# Patient Record
Sex: MALE | Race: WHITE | Employment: OTHER | URBAN - METROPOLITAN AREA
[De-identification: names, ages, dates, MRNs, and addresses within clinical notes are randomized per-mention and may not be internally consistent; named-entity substitution may affect disease eponyms.]

---

## 2017-10-25 RX ORDER — HYDROCHLOROTHIAZIDE 25 MG/1
TABLET ORAL
Qty: 90 TABLET | Refills: 0 | Status: SHIPPED | OUTPATIENT
Start: 2017-10-25 | End: 2017-12-18 | Stop reason: SDUPTHER

## 2017-11-27 RX ORDER — CARVEDILOL 3.12 MG/1
TABLET ORAL
Qty: 180 TABLET | Refills: 0 | Status: SHIPPED | OUTPATIENT
Start: 2017-11-27 | End: 2017-12-18 | Stop reason: SDUPTHER

## 2017-11-27 RX ORDER — ATORVASTATIN CALCIUM 80 MG/1
TABLET, FILM COATED ORAL
Qty: 90 TABLET | Refills: 0 | Status: SHIPPED | OUTPATIENT
Start: 2017-11-27 | End: 2017-12-18 | Stop reason: SDUPTHER

## 2017-12-18 ENCOUNTER — OFFICE VISIT (OUTPATIENT)
Dept: CARDIOLOGY CLINIC | Age: 62
End: 2017-12-18

## 2017-12-18 VITALS
HEIGHT: 72 IN | DIASTOLIC BLOOD PRESSURE: 82 MMHG | BODY MASS INDEX: 30.2 KG/M2 | OXYGEN SATURATION: 97 % | HEART RATE: 86 BPM | SYSTOLIC BLOOD PRESSURE: 130 MMHG | WEIGHT: 223 LBS

## 2017-12-18 DIAGNOSIS — I25.10 CORONARY ARTERY DISEASE INVOLVING NATIVE CORONARY ARTERY OF NATIVE HEART WITHOUT ANGINA PECTORIS: ICD-10-CM

## 2017-12-18 DIAGNOSIS — I10 ESSENTIAL HYPERTENSION: Primary | ICD-10-CM

## 2017-12-18 DIAGNOSIS — E78.2 MIXED HYPERLIPIDEMIA: ICD-10-CM

## 2017-12-18 PROCEDURE — 1036F TOBACCO NON-USER: CPT | Performed by: INTERNAL MEDICINE

## 2017-12-18 PROCEDURE — 99213 OFFICE O/P EST LOW 20 MIN: CPT | Performed by: INTERNAL MEDICINE

## 2017-12-18 PROCEDURE — G8599 NO ASA/ANTIPLAT THER USE RNG: HCPCS | Performed by: INTERNAL MEDICINE

## 2017-12-18 PROCEDURE — 3017F COLORECTAL CA SCREEN DOC REV: CPT | Performed by: INTERNAL MEDICINE

## 2017-12-18 PROCEDURE — G8484 FLU IMMUNIZE NO ADMIN: HCPCS | Performed by: INTERNAL MEDICINE

## 2017-12-18 PROCEDURE — G8427 DOCREV CUR MEDS BY ELIG CLIN: HCPCS | Performed by: INTERNAL MEDICINE

## 2017-12-18 PROCEDURE — G8417 CALC BMI ABV UP PARAM F/U: HCPCS | Performed by: INTERNAL MEDICINE

## 2017-12-18 RX ORDER — CARVEDILOL 3.12 MG/1
TABLET ORAL
Qty: 180 TABLET | Refills: 3 | Status: SHIPPED | OUTPATIENT
Start: 2017-12-18 | End: 2018-12-03 | Stop reason: SDUPTHER

## 2017-12-18 RX ORDER — ATORVASTATIN CALCIUM 80 MG/1
TABLET, FILM COATED ORAL
Qty: 90 TABLET | Refills: 3 | Status: SHIPPED | OUTPATIENT
Start: 2017-12-18 | End: 2019-12-09 | Stop reason: SDUPTHER

## 2017-12-18 RX ORDER — HYDROCHLOROTHIAZIDE 25 MG/1
TABLET ORAL
Qty: 90 TABLET | Refills: 3 | Status: SHIPPED | OUTPATIENT
Start: 2017-12-18 | End: 2018-11-23 | Stop reason: SDUPTHER

## 2017-12-18 NOTE — LETTER
[address] Cardiology - 61 [facility] [address] [address]  Phone: [phone]  Fax: [phone]    Sophia Bueno MD        2017     [address] [place] [id number]    Patient: Sonia Daniels  MR Number: [id number]  YOB: 1955  Date of Visit: 2017    Dear Dr. Rolando Martínez:    Thank you for allowing me to participate in the care of Sonia Daniels. Below are the relevant portions of my assessment and plan of care. [address]   Cardiovascular Evaluation    PATIENT: Sonia Daniels  DATE: 2017  MRN: [id number]  CSN: [id number]  : 1955    Primary Care Doctor: Rolando Martínez    Reason for evaluation:   Coronary Artery Disease; Hypertension; and Hyperlipidemia      History of present illness on initial date of evaluation:   Sonia Daniels is a 57 y.o. patient who presents for follow up CAD. He states he is feeling very well. He does admit his diabetes numbers have gone up. He is seeing Dr. Faustino Thurston for this. Patient Active Problem List   Diagnosis    Diabetes mellitus ([facility]    Essential hypertension    HLD (hyperlipidemia)    GERD (gastroesophageal reflux disease)    CAD (coronary artery disease)    Dehydration    Acute renal failure (HCC)    Prerenal azotemia    Nausea and vomiting    Sepsis due to Gram negative bacteria (HCC)    Fever and chills    Hypotension    Tachypnea    Lower urinary tract infectious disease    Poor appetite    NSTEMI (non-ST elevated myocardial infarction) ([address]    Chest pain         Cardiac Testing: I have reviewed the findings below. EKG:  ECHO:   STRESS TEST:  CATH: 2014  CONCLUSIONS: Two-vessel coronary artery angioplasty and drug-eluting stent  in the setting of non-ST-segment-elevation myocardial infarction completed by  Dr. Sophia Bueno at [facility] on 2014.     BYPASS:  VASCULAR: 1. Coronary artery disease: stable without concerning s/s of angina   ~Two-vessel coronary artery angioplasty and drug-eluting stent in the setting of non-ST-segment-elevation myocardial infarction completed by August 22, 2014  2. Hypertension   BP: (130)/(82)   3. Hyperlipidemia  4. Obesity   ~Body mass index is 30.24 kg/m². 5. DM  6. Post nephrectomy for renal CA    Plan:  1. The patient was seen for >25 minutes. >50% of the time was devoted to giving the patient detailed instructions instructions on addressing diet, regular exercise, weight control, smoking abstention, medication compliance, and stress minimization. The patient was provided written and verbal instructions regarding risk factor modification. 2. To improve overall cardiovascular health, the patient is instructed to increase cardiovascular related activities with a goal of 150 min/week of moderate level activity or 10,000 steps per day. 3. I recommend that the patient continue their currently prescribed medications. Their drug modifiable risk factors appear to be well controlled. I will continue to address the need/dosing of medications in future visits. 4. Follow up with me in 1 year. All questions and concerns were addressed to the patient/family. Alternatives to my treatment were discussed. The note was completed using EMR. Every effort was made to ensure accuracy; however, inadvertent computerized transcription errors may be present. Camacho aSndy MD, [facility] [place], [place]  [phone] [facility]  [phone] [facility]  12/18/2017  8:53 AM      If you have questions, please do not hesitate to call me. I look forward to following Darus Argue along with you.     Sincerely,        Camacho Sandy MD

## 2017-12-18 NOTE — PROGRESS NOTES
[address] [facility]   Cardiovascular Evaluation    PATIENT: Leon Fjaardo  DATE: 2017  MRN: [id number]  CSN: [id number]  : 1955    Primary Care Doctor: Aayush Trevino    Reason for evaluation:   Coronary Artery Disease; Hypertension; and Hyperlipidemia      History of present illness on initial date of evaluation:   Leon Fajardo is a 57 y.o. patient who presents for follow up CAD. He states he is feeling very well. He does admit his diabetes numbers have gone up. He is seeing Dr. Fish Pryor for this. Patient Active Problem List   Diagnosis    Diabetes mellitus ([address]    Essential hypertension    HLD (hyperlipidemia)    GERD (gastroesophageal reflux disease)    CAD (coronary artery disease)    Dehydration    Acute renal failure (HCC)    Prerenal azotemia    Nausea and vomiting    Sepsis due to Gram negative bacteria (HCC)    Fever and chills    Hypotension    Tachypnea    Lower urinary tract infectious disease    Poor appetite    NSTEMI (non-ST elevated myocardial infarction) ([address]    Chest pain         Cardiac Testing: I have reviewed the findings below. EKG:  ECHO:   STRESS TEST:  CATH: 2014  CONCLUSIONS: Two-vessel coronary artery angioplasty and drug-eluting stent  in the setting of non-ST-segment-elevation myocardial infarction completed by  Dr. Lefty Barton at [facility] on 2014. BYPASS:  VASCULAR:      Past Medical History:   has a past medical history of Abdominal pain, other specified site; Abnormal liver function test; CAD (coronary artery disease); Cancer ([address] Chest pain; Diabetes mellitus ([address] GERD (gastroesophageal reflux disease); Hx of blood clots; Hypercholesteremia; Hypertension; Jaundice; and Renal mass, left. Surgical History:   has a past surgical history that includes knee surgery; Cardiac catheterization (2011);  Upper gastrointestinal endoscopy (13); partial nephrectomy (Left, 2013); and for - mood swings or temperature intolerance  Breast ROS: deferred  Respiratory ROS: negative for - hemoptysis or stridor  Cardiovascular ROS: negative for - chest pain, dyspnea on exertion or palpitations  Gastrointestinal ROS: no abdominal pain, change in bowel habits, or black or bloody stools  Genito-Urinary ROS: no dysuria, trouble voiding, or hematuria  Musculoskeletal ROS: negative for - gait disturbance, joint pain or joint stiffness  Neurological ROS: negative for - seizures or speech problems  Dermatological ROS: negative for - rash or skin lesion changes      Physical Examination:    Vitals:    12/18/17 0837   BP: 130/82   Pulse: 86   SpO2: 97%    Weight: 223 lb (101.2 kg)     Wt Readings from Last 3 Encounters:   12/18/17 223 lb (101.2 kg)   12/22/16 216 lb (98 kg)   06/09/15 223 lb (101.2 kg)         General Appearance:  Alert, cooperative, no distress, appears stated age   Head:  Normocephalic, without obvious abnormality, atraumatic   Eyes:  PERRL, conjunctiva/corneas clear       Nose: Nares normal, no drainage or sinus tenderness   Throat: Lips, mucosa, and tongue normal   Neck: Supple, symmetrical, trachea midline, no adenopathy, thyroid: not enlarged, symmetric, no tenderness/mass/nodules, no carotid bruit or JVD       Lungs:   Clear to auscultation bilaterally, respirations unlabored   Chest Wall:  No tenderness or deformity   Heart:  Regular rhythm and normal rate; S1, S2 are normal; no murmur noted; no rub or gallop   Abdomen:   Soft, non-tender, bowel sounds active all four quadrants,  no masses, no organomegaly           Extremities: Extremities normal, atraumatic, no cyanosis or edema   Pulses: 2+ and symmetric   Skin: Skin color, texture, turgor normal, no rashes or lesions   Pysch: Normal mood and affect   Neurologic: Normal gross motor and sensory exam.         Labs  CBC:   Lab Results   Component Value Date    WBC 8.8 08/23/2014    RBC 4.64 08/23/2014    HGB 14.4 08/23/2014    HCT 42.6 08/23/2014    MCV 91.8 08/23/2014    RDW 13.5 08/23/2014     08/23/2014     CMP:    Lab Results   Component Value Date     08/23/2014    K 4.0 08/23/2014    CL 99 08/23/2014    CO2 25 08/23/2014    BUN 16 08/23/2014    CREATININE 1.0 08/23/2014    GFRAA >60 08/23/2014    GFRAA >60 04/03/2013    AGRATIO 1.2 08/21/2014    LABGLOM >60 08/23/2014    GLUCOSE 147 08/23/2014    PROT 7.7 08/21/2014    PROT 6.8 07/05/2011    CALCIUM 9.2 08/23/2014    BILITOT 0.3 08/21/2014    ALKPHOS 60 08/21/2014    AST 27 08/21/2014    ALT 11 08/21/2014     PT/INR:    No components found for: PTPATIENT,  PTINR  Lab Results   Component Value Date    CKTOTAL 390 (H) 04/21/2014    TROPONINI 0.04 (H) 08/22/2014     No components found for: CHLPL  Lab Results   Component Value Date    TRIG 325 (H) 08/22/2014    TRIG 269 (H) 04/01/2013    TRIG 581 (H) 07/05/2011     Lab Results   Component Value Date    HDL 24 (L) 08/22/2014    HDL 28 (L) 04/01/2013    HDL 25 (L) 07/05/2011     Lab Results   Component Value Date    LDLCALC see below 08/22/2014    LDLCALC 116 (H) 04/01/2013     Lab Results   Component Value Date    LABVLDL see below 08/22/2014    LABVLDL 54 04/01/2013     Lab Results   Component Value Date    ALT 11 08/21/2014    AST 27 08/21/2014    ALKPHOS 60 08/21/2014    BILITOT 0.3 08/21/2014       Assessment:  57 y.o. patient with:  1. Coronary artery disease: stable without concerning s/s of angina   ~Two-vessel coronary artery angioplasty and drug-eluting stent in the setting of non-ST-segment-elevation myocardial infarction completed by August 22, 2014  2. Hypertension   BP: (130)/(82)   3. Hyperlipidemia  4. Obesity   ~Body mass index is 30.24 kg/m². 5. DM  6. Post nephrectomy for renal CA    Plan:  1. The patient was seen for >25 minutes.  >50% of the time was devoted to giving the patient detailed instructions instructions on addressing diet, regular exercise, weight control, smoking abstention, medication compliance, and stress minimization. The patient was provided written and verbal instructions regarding risk factor modification. 2. To improve overall cardiovascular health, the patient is instructed to increase cardiovascular related activities with a goal of 150 min/week of moderate level activity or 10,000 steps per day. 3. I recommend that the patient continue their currently prescribed medications. Their drug modifiable risk factors appear to be well controlled. I will continue to address the need/dosing of medications in future visits. 4. Follow up with me in 1 year. All questions and concerns were addressed to the patient/family. Alternatives to my treatment were discussed. The note was completed using EMR. Every effort was made to ensure accuracy; however, inadvertent computerized transcription errors may be present.     Perlita Turner MD, [address], [place], [place]  [phone] [facility] office  [phone] [facility] central  12/18/2017  8:53 AM

## 2017-12-27 NOTE — COMMUNICATION BODY
[address] [facility]   Cardiovascular Evaluation    PATIENT: Matty Olson  DATE: 2017  MRN: [id number]  CSN: [id number]  : 1955    Primary Care Doctor: Marcelo Chavez    Reason for evaluation:   Coronary Artery Disease; Hypertension; and Hyperlipidemia      History of present illness on initial date of evaluation:   Matty Olson is a 57 y.o. patient who presents for follow up CAD. He states he is feeling very well. He does admit his diabetes numbers have gone up. He is seeing Dr. Elio Vieira for this. Patient Active Problem List   Diagnosis    Diabetes mellitus ([address]    Essential hypertension    HLD (hyperlipidemia)    GERD (gastroesophageal reflux disease)    CAD (coronary artery disease)    Dehydration    Acute renal failure (HCC)    Prerenal azotemia    Nausea and vomiting    Sepsis due to Gram negative bacteria (HCC)    Fever and chills    Hypotension    Tachypnea    Lower urinary tract infectious disease    Poor appetite    NSTEMI (non-ST elevated myocardial infarction) ([address]    Chest pain         Cardiac Testing: I have reviewed the findings below. EKG:  ECHO:   STRESS TEST:  CATH: 2014  CONCLUSIONS: Two-vessel coronary artery angioplasty and drug-eluting stent  in the setting of non-ST-segment-elevation myocardial infarction completed by  Dr. Lyndon Castillo at [facility] on 2014. BYPASS:  VASCULAR:      Past Medical History:   has a past medical history of Abdominal pain, other specified site; Abnormal liver function test; CAD (coronary artery disease); Cancer ([address] Chest pain; Diabetes mellitus ([address] GERD (gastroesophageal reflux disease); Hx of blood clots; Hypercholesteremia; Hypertension; Jaundice; and Renal mass, left. Surgical History:   has a past surgical history that includes knee surgery; Cardiac catheterization (2011);  Upper gastrointestinal endoscopy (13); partial nephrectomy (Left, 2013); and for - mood swings or temperature intolerance  Breast ROS: deferred  Respiratory ROS: negative for - hemoptysis or stridor  Cardiovascular ROS: negative for - chest pain, dyspnea on exertion or palpitations  Gastrointestinal ROS: no abdominal pain, change in bowel habits, or black or bloody stools  Genito-Urinary ROS: no dysuria, trouble voiding, or hematuria  Musculoskeletal ROS: negative for - gait disturbance, joint pain or joint stiffness  Neurological ROS: negative for - seizures or speech problems  Dermatological ROS: negative for - rash or skin lesion changes      Physical Examination:    Vitals:    12/18/17 0837   BP: 130/82   Pulse: 86   SpO2: 97%    Weight: 223 lb (101.2 kg)     Wt Readings from Last 3 Encounters:   12/18/17 223 lb (101.2 kg)   12/22/16 216 lb (98 kg)   06/09/15 223 lb (101.2 kg)         General Appearance:  Alert, cooperative, no distress, appears stated age   Head:  Normocephalic, without obvious abnormality, atraumatic   Eyes:  PERRL, conjunctiva/corneas clear       Nose: Nares normal, no drainage or sinus tenderness   Throat: Lips, mucosa, and tongue normal   Neck: Supple, symmetrical, trachea midline, no adenopathy, thyroid: not enlarged, symmetric, no tenderness/mass/nodules, no carotid bruit or JVD       Lungs:   Clear to auscultation bilaterally, respirations unlabored   Chest Wall:  No tenderness or deformity   Heart:  Regular rhythm and normal rate; S1, S2 are normal; no murmur noted; no rub or gallop   Abdomen:   Soft, non-tender, bowel sounds active all four quadrants,  no masses, no organomegaly           Extremities: Extremities normal, atraumatic, no cyanosis or edema   Pulses: 2+ and symmetric   Skin: Skin color, texture, turgor normal, no rashes or lesions   Pysch: Normal mood and affect   Neurologic: Normal gross motor and sensory exam.         Labs  CBC:   Lab Results   Component Value Date    WBC 8.8 08/23/2014    RBC 4.64 08/23/2014    HGB 14.4 08/23/2014    HCT 42.6 08/23/2014    MCV 91.8 08/23/2014    RDW 13.5 08/23/2014     08/23/2014     CMP:    Lab Results   Component Value Date     08/23/2014    K 4.0 08/23/2014    CL 99 08/23/2014    CO2 25 08/23/2014    BUN 16 08/23/2014    CREATININE 1.0 08/23/2014    GFRAA >60 08/23/2014    GFRAA >60 04/03/2013    AGRATIO 1.2 08/21/2014    LABGLOM >60 08/23/2014    GLUCOSE 147 08/23/2014    PROT 7.7 08/21/2014    PROT 6.8 07/05/2011    CALCIUM 9.2 08/23/2014    BILITOT 0.3 08/21/2014    ALKPHOS 60 08/21/2014    AST 27 08/21/2014    ALT 11 08/21/2014     PT/INR:    No components found for: PTPATIENT,  PTINR  Lab Results   Component Value Date    CKTOTAL 390 (H) 04/21/2014    TROPONINI 0.04 (H) 08/22/2014     No components found for: CHLPL  Lab Results   Component Value Date    TRIG 325 (H) 08/22/2014    TRIG 269 (H) 04/01/2013    TRIG 581 (H) 07/05/2011     Lab Results   Component Value Date    HDL 24 (L) 08/22/2014    HDL 28 (L) 04/01/2013    HDL 25 (L) 07/05/2011     Lab Results   Component Value Date    LDLCALC see below 08/22/2014    LDLCALC 116 (H) 04/01/2013     Lab Results   Component Value Date    LABVLDL see below 08/22/2014    LABVLDL 54 04/01/2013     Lab Results   Component Value Date    ALT 11 08/21/2014    AST 27 08/21/2014    ALKPHOS 60 08/21/2014    BILITOT 0.3 08/21/2014       Assessment:  57 y.o. patient with:  1. Coronary artery disease: stable without concerning s/s of angina   ~Two-vessel coronary artery angioplasty and drug-eluting stent in the setting of non-ST-segment-elevation myocardial infarction completed by August 22, 2014  2. Hypertension   BP: (130)/(82)   3. Hyperlipidemia  4. Obesity   ~Body mass index is 30.24 kg/m². 5. DM  6. Post nephrectomy for renal CA    Plan:  1. The patient was seen for >25 minutes.  >50% of the time was devoted to giving the patient detailed instructions instructions on addressing diet, regular exercise, weight control, smoking abstention, medication compliance, and stress minimization. The patient was provided written and verbal instructions regarding risk factor modification. 2. To improve overall cardiovascular health, the patient is instructed to increase cardiovascular related activities with a goal of 150 min/week of moderate level activity or 10,000 steps per day. 3. I recommend that the patient continue their currently prescribed medications. Their drug modifiable risk factors appear to be well controlled. I will continue to address the need/dosing of medications in future visits. 4. Follow up with me in 1 year. All questions and concerns were addressed to the patient/family. Alternatives to my treatment were discussed. The note was completed using EMR. Every effort was made to ensure accuracy; however, inadvertent computerized transcription errors may be present.     Robinson Jasso MD, [address], [place], [place]  [phone] [facility]  [phone] [facility]  12/18/2017  8:53 AM

## 2018-11-26 RX ORDER — HYDROCHLOROTHIAZIDE 25 MG/1
TABLET ORAL
Qty: 90 TABLET | Refills: 0 | Status: SHIPPED | OUTPATIENT
Start: 2018-11-26 | End: 2018-12-03 | Stop reason: SDUPTHER

## 2018-11-26 NOTE — TELEPHONE ENCOUNTER
BRYANP Pt  Last office visit 12/18/2017    Assessment:  57 y.o. patient with:  1. Coronary artery disease: stable without concerning s/s of angina              ~Two-vessel coronary artery angioplasty and drug-eluting stent in the setting of non-ST-segment-elevation myocardial infarction completed by August 22, 2014  2. Hypertension              BP: (130)/(82)   3. Hyperlipidemia  4. Obesity              ~Body mass index is 30.24 kg/m². 5. DM  6. Post nephrectomy for renal CA     Plan:  1. The patient was seen for >25 minutes. >50% of the time was devoted to giving the patient detailed instructions instructions on addressing diet, regular exercise, weight control, smoking abstention, medication compliance, and stress minimization. The patient was provided written and verbal instructions regarding risk factor modification. 2. To improve overall cardiovascular health, the patient is instructed to increase cardiovascular related activities with a goal of 150 min/week of moderate level activity or 10,000 steps per day. 3. I recommend that the patient continue their currently prescribed medications. Their drug modifiable risk factors appear to be well controlled. I will continue to address the need/dosing of medications in future visits. 4. Follow up with me in 1 year.        Upcoming appt BRYANP 12/3/2018

## 2018-12-03 ENCOUNTER — OFFICE VISIT (OUTPATIENT)
Dept: CARDIOLOGY CLINIC | Age: 63
End: 2018-12-03
Payer: MEDICARE

## 2018-12-03 VITALS
OXYGEN SATURATION: 98 % | WEIGHT: 219.4 LBS | BODY MASS INDEX: 29.72 KG/M2 | SYSTOLIC BLOOD PRESSURE: 128 MMHG | HEIGHT: 72 IN | DIASTOLIC BLOOD PRESSURE: 78 MMHG | HEART RATE: 65 BPM

## 2018-12-03 DIAGNOSIS — I10 ESSENTIAL HYPERTENSION: ICD-10-CM

## 2018-12-03 DIAGNOSIS — I25.10 CORONARY ARTERY DISEASE INVOLVING NATIVE CORONARY ARTERY OF NATIVE HEART WITHOUT ANGINA PECTORIS: Primary | ICD-10-CM

## 2018-12-03 DIAGNOSIS — E78.2 MIXED HYPERLIPIDEMIA: ICD-10-CM

## 2018-12-03 PROCEDURE — 99214 OFFICE O/P EST MOD 30 MIN: CPT | Performed by: INTERNAL MEDICINE

## 2018-12-03 RX ORDER — HYDROCHLOROTHIAZIDE 25 MG/1
TABLET ORAL
Qty: 90 TABLET | Refills: 3 | Status: SHIPPED | OUTPATIENT
Start: 2018-12-03 | End: 2018-12-03 | Stop reason: SDUPTHER

## 2018-12-03 RX ORDER — CARVEDILOL 3.12 MG/1
TABLET ORAL
Qty: 180 TABLET | Refills: 3 | Status: SHIPPED | OUTPATIENT
Start: 2018-12-03 | End: 2018-12-03 | Stop reason: SDUPTHER

## 2018-12-03 RX ORDER — HYDROCHLOROTHIAZIDE 25 MG/1
TABLET ORAL
Qty: 90 TABLET | Refills: 3 | Status: SHIPPED | OUTPATIENT
Start: 2018-12-03 | End: 2019-12-09 | Stop reason: SDUPTHER

## 2018-12-03 RX ORDER — CARVEDILOL 3.12 MG/1
TABLET ORAL
Qty: 180 TABLET | Refills: 3 | Status: SHIPPED | OUTPATIENT
Start: 2018-12-03 | End: 2019-12-09 | Stop reason: SDUPTHER

## 2018-12-03 NOTE — PROGRESS NOTES
current medications as prescribed. 2. Discussed the importance of diabetes control, weight reduction and lifestyle modification. 3. Follow up with me in  1 year. This note was scribed in the presence of Bishop Law MD, by Aguila Lopez RN.     I, Dr. Bishop Law, personally performed the services described in this documentation, as scribed by the above signed scribe in my presence. It is both accurate and complete to my knowledge. I agree with the details independently gathered by the clinical support staff, while the remaining scribed note accurately describes my personal service to the patient. All questions and concerns were addressed to the patient/family. Alternatives to my treatment were discussed. The note was completed using EMR. Every effort was made to ensure accuracy; however, inadvertent computerized transcription errors may be present.     Bishop Law MD, [address], [place], [place]  [phone] [facility] [place]  [phone] [facility]  12/3/2018  9:09 AM

## 2019-12-09 ENCOUNTER — OFFICE VISIT (OUTPATIENT)
Dept: CARDIOLOGY CLINIC | Age: 64
End: 2019-12-09
Payer: MEDICARE

## 2019-12-09 VITALS
SYSTOLIC BLOOD PRESSURE: 150 MMHG | OXYGEN SATURATION: 99 % | BODY MASS INDEX: 29.34 KG/M2 | HEART RATE: 79 BPM | DIASTOLIC BLOOD PRESSURE: 82 MMHG | WEIGHT: 216.6 LBS | HEIGHT: 72 IN

## 2019-12-09 DIAGNOSIS — I25.10 CORONARY ARTERY DISEASE INVOLVING NATIVE CORONARY ARTERY OF NATIVE HEART WITHOUT ANGINA PECTORIS: Primary | ICD-10-CM

## 2019-12-09 DIAGNOSIS — R06.02 SHORTNESS OF BREATH: ICD-10-CM

## 2019-12-09 DIAGNOSIS — I10 ESSENTIAL HYPERTENSION: ICD-10-CM

## 2019-12-09 DIAGNOSIS — E78.2 MIXED HYPERLIPIDEMIA: ICD-10-CM

## 2019-12-09 PROCEDURE — 1036F TOBACCO NON-USER: CPT | Performed by: INTERNAL MEDICINE

## 2019-12-09 PROCEDURE — G8417 CALC BMI ABV UP PARAM F/U: HCPCS | Performed by: INTERNAL MEDICINE

## 2019-12-09 PROCEDURE — 3017F COLORECTAL CA SCREEN DOC REV: CPT | Performed by: INTERNAL MEDICINE

## 2019-12-09 PROCEDURE — 99214 OFFICE O/P EST MOD 30 MIN: CPT | Performed by: INTERNAL MEDICINE

## 2019-12-09 PROCEDURE — G8484 FLU IMMUNIZE NO ADMIN: HCPCS | Performed by: INTERNAL MEDICINE

## 2019-12-09 PROCEDURE — 1123F ACP DISCUSS/DSCN MKR DOCD: CPT | Performed by: INTERNAL MEDICINE

## 2019-12-09 PROCEDURE — G8599 NO ASA/ANTIPLAT THER USE RNG: HCPCS | Performed by: INTERNAL MEDICINE

## 2019-12-09 PROCEDURE — 4040F PNEUMOC VAC/ADMIN/RCVD: CPT | Performed by: INTERNAL MEDICINE

## 2019-12-09 PROCEDURE — G8427 DOCREV CUR MEDS BY ELIG CLIN: HCPCS | Performed by: INTERNAL MEDICINE

## 2019-12-09 RX ORDER — HYDROCHLOROTHIAZIDE 25 MG/1
TABLET ORAL
Qty: 90 TABLET | Refills: 3 | Status: SHIPPED | OUTPATIENT
Start: 2019-12-09 | End: 2020-12-01

## 2019-12-09 RX ORDER — ATORVASTATIN CALCIUM 80 MG/1
TABLET, FILM COATED ORAL
Qty: 90 TABLET | Refills: 3 | Status: SHIPPED | OUTPATIENT
Start: 2019-12-09 | End: 2020-12-01

## 2019-12-09 RX ORDER — CARVEDILOL 3.12 MG/1
TABLET ORAL
Qty: 180 TABLET | Refills: 3 | Status: SHIPPED | OUTPATIENT
Start: 2019-12-09 | End: 2019-12-27 | Stop reason: SDUPTHER

## 2019-12-17 ENCOUNTER — HOSPITAL ENCOUNTER (OUTPATIENT)
Dept: CARDIOLOGY | Age: 64
Discharge: HOME | End: 2019-12-17
Payer: MEDICARE

## 2019-12-17 DIAGNOSIS — R06.02 SHORTNESS OF BREATH: ICD-10-CM

## 2019-12-17 LAB
LV EF: 45 %
LVEF MODALITY: NORMAL

## 2019-12-17 PROCEDURE — 93306 TTE W/DOPPLER COMPLETE: CPT

## 2019-12-27 ENCOUNTER — HOSPITAL ENCOUNTER (OUTPATIENT)
Dept: CARDIOLOGY | Age: 64
Discharge: HOME | End: 2019-12-27
Payer: MEDICARE

## 2019-12-27 DIAGNOSIS — I10 ESSENTIAL HYPERTENSION: Primary | ICD-10-CM

## 2019-12-27 LAB
LV EF: 41 %
LVEF MODALITY: NORMAL

## 2019-12-27 PROCEDURE — 93017 CV STRESS TEST TRACING ONLY: CPT

## 2019-12-27 PROCEDURE — 3430000000 HC RX DIAGNOSTIC RADIOPHARMACEUTICAL: Performed by: INTERNAL MEDICINE

## 2019-12-27 PROCEDURE — 78452 HT MUSCLE IMAGE SPECT MULT: CPT

## 2019-12-27 PROCEDURE — A9502 TC99M TETROFOSMIN: HCPCS | Performed by: INTERNAL MEDICINE

## 2019-12-27 RX ORDER — LISINOPRIL 10 MG/1
10 TABLET ORAL DAILY
Qty: 90 TABLET | Refills: 1 | Status: SHIPPED | OUTPATIENT
Start: 2019-12-27 | End: 2021-08-23 | Stop reason: SDUPTHER

## 2019-12-27 RX ORDER — CARVEDILOL 6.25 MG/1
TABLET ORAL
Qty: 180 TABLET | Refills: 3 | Status: SHIPPED | OUTPATIENT
Start: 2019-12-27 | End: 2020-12-01

## 2019-12-27 RX ADMIN — TETROFOSMIN 34.5 MILLICURIE: 1.38 INJECTION, POWDER, LYOPHILIZED, FOR SOLUTION INTRAVENOUS at 09:03

## 2019-12-27 RX ADMIN — TETROFOSMIN 10.3 MILLICURIE: 1.38 INJECTION, POWDER, LYOPHILIZED, FOR SOLUTION INTRAVENOUS at 07:45

## 2020-02-04 ENCOUNTER — HOSPITAL ENCOUNTER (OUTPATIENT)
Age: 65
Discharge: HOME | End: 2020-02-04
Payer: MEDICARE

## 2020-02-04 LAB
ALBUMIN SERPL-MCNC: 3.8 G/DL (ref 3.4–5)
ANION GAP SERPL CALCULATED.3IONS-SCNC: 17 MMOL/L (ref 3–16)
BUN BLDV-MCNC: 20 MG/DL (ref 7–20)
CALCIUM SERPL-MCNC: 9.9 MG/DL (ref 8.3–10.6)
CHLORIDE BLD-SCNC: 100 MMOL/L (ref 99–110)
CO2: 23 MMOL/L (ref 21–32)
CREAT SERPL-MCNC: 0.8 MG/DL (ref 0.8–1.3)
GFR AFRICAN AMERICAN: >60
GFR NON-AFRICAN AMERICAN: >60
GLUCOSE BLD-MCNC: 91 MG/DL (ref 70–99)
PHOSPHORUS: 3.4 MG/DL (ref 2.5–4.9)
POTASSIUM SERPL-SCNC: 5.1 MMOL/L (ref 3.5–5.1)
SODIUM BLD-SCNC: 140 MMOL/L (ref 136–145)

## 2020-02-04 PROCEDURE — 80069 RENAL FUNCTION PANEL: CPT

## 2020-02-04 PROCEDURE — 36415 COLL VENOUS BLD VENIPUNCTURE: CPT

## 2020-12-01 RX ORDER — CARVEDILOL 6.25 MG/1
TABLET ORAL
Qty: 180 TABLET | Refills: 1 | Status: SHIPPED | OUTPATIENT
Start: 2020-12-01 | End: 2021-06-03

## 2020-12-01 RX ORDER — HYDROCHLOROTHIAZIDE 25 MG/1
TABLET ORAL
Qty: 90 TABLET | Refills: 1 | Status: SHIPPED | OUTPATIENT
Start: 2020-12-01 | End: 2021-08-23 | Stop reason: SDUPTHER

## 2020-12-01 RX ORDER — ATORVASTATIN CALCIUM 80 MG/1
TABLET, FILM COATED ORAL
Qty: 90 TABLET | Refills: 1 | Status: SHIPPED | OUTPATIENT
Start: 2020-12-01 | End: 2021-06-03

## 2021-06-03 ENCOUNTER — TELEPHONE (OUTPATIENT)
Dept: CARDIOLOGY CLINIC | Age: 66
End: 2021-06-03

## 2021-06-03 RX ORDER — CARVEDILOL 6.25 MG/1
TABLET ORAL
Qty: 180 TABLET | Refills: 0 | Status: SHIPPED | OUTPATIENT
Start: 2021-06-03 | End: 2021-08-23 | Stop reason: SDUPTHER

## 2021-06-03 RX ORDER — ATORVASTATIN CALCIUM 80 MG/1
TABLET, FILM COATED ORAL
Qty: 90 TABLET | Refills: 0 | Status: SHIPPED | OUTPATIENT
Start: 2021-06-03 | End: 2021-08-23 | Stop reason: SDUPTHER

## 2021-06-03 NOTE — TELEPHONE ENCOUNTER
06/03-Called ([phone]) unable to make contact w/pt, LAMONT asking for a CB so we can schedule his yearly/med refill w/[facility]

## 2021-08-23 ENCOUNTER — OFFICE VISIT (OUTPATIENT)
Dept: CARDIOLOGY CLINIC | Age: 66
End: 2021-08-23
Payer: MEDICARE

## 2021-08-23 VITALS
BODY MASS INDEX: 29.12 KG/M2 | HEART RATE: 88 BPM | SYSTOLIC BLOOD PRESSURE: 130 MMHG | OXYGEN SATURATION: 95 % | WEIGHT: 215 LBS | DIASTOLIC BLOOD PRESSURE: 72 MMHG | HEIGHT: 72 IN

## 2021-08-23 DIAGNOSIS — E78.2 MIXED HYPERLIPIDEMIA: ICD-10-CM

## 2021-08-23 DIAGNOSIS — I25.10 CORONARY ARTERY DISEASE INVOLVING NATIVE CORONARY ARTERY OF NATIVE HEART WITHOUT ANGINA PECTORIS: Primary | ICD-10-CM

## 2021-08-23 DIAGNOSIS — I10 ESSENTIAL HYPERTENSION: ICD-10-CM

## 2021-08-23 PROCEDURE — G8417 CALC BMI ABV UP PARAM F/U: HCPCS | Performed by: INTERNAL MEDICINE

## 2021-08-23 PROCEDURE — 1123F ACP DISCUSS/DSCN MKR DOCD: CPT | Performed by: INTERNAL MEDICINE

## 2021-08-23 PROCEDURE — 1036F TOBACCO NON-USER: CPT | Performed by: INTERNAL MEDICINE

## 2021-08-23 PROCEDURE — 3017F COLORECTAL CA SCREEN DOC REV: CPT | Performed by: INTERNAL MEDICINE

## 2021-08-23 PROCEDURE — 99213 OFFICE O/P EST LOW 20 MIN: CPT | Performed by: INTERNAL MEDICINE

## 2021-08-23 PROCEDURE — 4040F PNEUMOC VAC/ADMIN/RCVD: CPT | Performed by: INTERNAL MEDICINE

## 2021-08-23 PROCEDURE — G8427 DOCREV CUR MEDS BY ELIG CLIN: HCPCS | Performed by: INTERNAL MEDICINE

## 2021-08-23 RX ORDER — LISINOPRIL 10 MG/1
10 TABLET ORAL DAILY
Qty: 90 TABLET | Refills: 3 | Status: SHIPPED | OUTPATIENT
Start: 2021-08-23

## 2021-08-23 RX ORDER — HYDROCHLOROTHIAZIDE 25 MG/1
25 TABLET ORAL DAILY
Qty: 90 TABLET | Refills: 3 | Status: SHIPPED | OUTPATIENT
Start: 2021-08-23

## 2021-08-23 RX ORDER — CARVEDILOL 6.25 MG/1
6.25 TABLET ORAL 2 TIMES DAILY
Qty: 180 TABLET | Refills: 3 | Status: SHIPPED | OUTPATIENT
Start: 2021-08-23 | End: 2021-10-11

## 2021-08-23 RX ORDER — ATORVASTATIN CALCIUM 80 MG/1
80 TABLET, FILM COATED ORAL DAILY
Qty: 90 TABLET | Refills: 3 | Status: SHIPPED | OUTPATIENT
Start: 2021-08-23 | End: 2022-10-04 | Stop reason: SDUPTHER

## 2021-08-23 NOTE — PROGRESS NOTES
[address] [facility]   Cardiovascular Evaluation    PATIENT: Katlin Paulson  DATE: 2021  MRN: <[id number]>  CSN: [id number]  : 1955    Primary Care Doctor: Elaine Hess    Reason for evaluation:   1 Year Follow Up, Medication Refill, and Coronary Artery Disease    Subjective:    History of present illness on initial date of evaluation:   Katlin Paulson is a 77 y.o. patient who presents for follow up. He has a medical history including coronary artery disease, hypertension, hyperlipidemia, obesity, diabetes mellitus, and renal carcinoma with nephrectomy. He had a stress test completed on 19 that showed ejection fraction of 41%. He was hypertensive responsive to exercise and study showed soft tissue attenuation artifact with no ischemia. An Echocardiogram completed on 19 showed EF of 45%, which could not discrete area of basal inferior hypokinesis. Today he states he is doing well. Patient currently denies any weight gain, edema, palpitations, chest pain, shortness of breath, dizziness, and syncope. He states he HbA1c last was 7.0, and follows with pcp. He takes all medications as prescribed, tolerating well. He states he is vaccinated for covid. Patient Active Problem List   Diagnosis    Diabetes mellitus ([facility] [address]    Essential hypertension    Mixed hyperlipidemia    GERD (gastroesophageal reflux disease)    Coronary artery disease involving native coronary artery of native heart without angina pectoris    Acute renal failure (HCC)    Prerenal azotemia    Nausea and vomiting    Sepsis due to Gram negative bacteria (HCC)    Fever and chills    Hypotension    Tachypnea    Poor appetite    NSTEMI (non-ST elevated myocardial infarction) ([address]    Chest pain         Cardiac Testing: I have reviewed the findings below.   EKG:  ECHO:   STRESS TEST:  CATH: 2014  CONCLUSIONS: Two-vessel coronary artery angioplasty and drug-eluting stent  in the setting of non-ST-segment-elevation myocardial infarction completed by  Dr. Markus Shaikh at [facility] on August 22, 2014. BYPASS:  VASCULAR:      Past Medical History:   has a past medical history of Abdominal pain, other specified site, Abnormal liver function test, CAD (coronary artery disease), Cancer ([facility] [address] Chest pain, Diabetes mellitus ([facility] [address] GERD (gastroesophageal reflux disease), Hx of blood clots, Hypercholesteremia, Hypertension, Jaundice, and Renal mass, left. Surgical History:   has a past surgical history that includes knee surgery; Cardiac catheterization (07/2011); Upper gastrointestinal endoscopy (4/1/13); partial nephrectomy (Left, 9-); and Coronary angioplasty. Social History:   reports that he quit smoking about 21 years ago. He has a 52.50 pack-year smoking history. He has never used smokeless tobacco. He reports that he does not drink alcohol and does not use drugs. Family History:  No evidence for sudden cardiac death or premature CAD    Home Medications:  Reviewed and are listed in nursing record. and/or listed below  Current Outpatient Medications   Medication Sig Dispense Refill    carvedilol (COREG) 6.25 MG tablet Take 1 tablet by mouth 2 times daily 180 tablet 3    atorvastatin (LIPITOR) 80 MG tablet Take 1 tablet by mouth daily 90 tablet 3    hydroCHLOROthiazide (HYDRODIURIL) 25 MG tablet Take 1 tablet by mouth daily 90 tablet 3    lisinopril (PRINIVIL;ZESTRIL) 10 MG tablet Take 1 tablet by mouth daily 90 tablet 3    metFORMIN (GLUCOPHAGE) 1000 MG tablet Take 1,000 mg by mouth 2 times daily (with meals).  aspirin EC 81 MG EC tablet Take 1 tablet by mouth daily. 30 tablet 3    insulin glargine (LANTUS) 100 UNIT/ML injection   Inject 30 Units into the skin 2 times daily Morning and bedtime      pantoprazole (PROTONIX) 40 MG tablet Take 1 tablet by mouth every morning (before breakfast).  30 tablet 2    glipiZIDE (GLUCOTROL XL) 10 MG CR tablet Take 10 mg by mouth 2 times daily. No current facility-administered medications for this visit. Allergies:  Amoxicillin-pot clavulanate     Review of Systems: Review of Systems:   All 14 point review of symptoms completed. Pertinent positives identified in the HPI, all other review of symptoms negative as below.     Review of Systems - History obtained from the patient  General ROS: negative for - chills, fever or night sweats  Psychological ROS: negative for - disorientation or hallucinations  Ophthalmic ROS: negative for - dry eyes, eye pain or loss of vision  ENT ROS: negative for - nasal discharge or sore throat  Allergy and Immunology ROS: negative for - hives or itchy/watery eyes  Hematological and Lymphatic ROS: negative for - jaundice or night sweats  Endocrine ROS: negative for - mood swings or temperature intolerance  Breast ROS: deferred  Respiratory ROS: negative for - hemoptysis or stridor  Cardiovascular ROS: negative for - chest pain, dyspnea on exertion or palpitations  Gastrointestinal ROS: no abdominal pain, change in bowel habits, or black or bloody stools  Genito-Urinary ROS: no dysuria, trouble voiding, or hematuria  Musculoskeletal ROS: negative for - gait disturbance, joint pain or joint stiffness  Neurological ROS: negative for - seizures or speech problems  Dermatological ROS: negative for - rash or skin lesion changes      Physical Examination:    Vitals:    08/23/21 1457   BP: 130/72   Pulse: 88   SpO2: 95%    Weight: 215 lb (97.5 kg)     Wt Readings from Last 3 Encounters:   08/23/21 215 lb (97.5 kg)   12/09/19 216 lb 9.6 oz (98.2 kg)   12/03/18 219 lb 6.4 oz (99.5 kg)         General Appearance:  Alert, cooperative, no distress, appears stated age   Head:  Normocephalic, without obvious abnormality, atraumatic   Eyes:  PERRL, conjunctiva/corneas clear       Nose: Nares normal, no drainage or sinus tenderness   Throat: Lips, mucosa, and tongue normal   Neck: Supple, symmetrical, trachea midline, no adenopathy, thyroid: not enlarged, symmetric, no tenderness/mass/nodules, no carotid bruit or JVD       Lungs:   Clear to auscultation bilaterally, respirations unlabored   Chest Wall:  No tenderness or deformity   Heart:  Regular rhythm and normal rate; S1, S2 are normal; no murmur noted; no rub or gallop   Abdomen:   Soft, non-tender, bowel sounds active all four quadrants,  no masses, no organomegaly           Extremities: Extremities normal, atraumatic, no cyanosis or edema   Pulses: 2+ and symmetric   Skin: Skin color, texture, turgor normal, no rashes or lesions   Pysch: Normal mood and affect   Neurologic: Normal gross motor and sensory exam.         Labs  CBC:   Lab Results   Component Value Date    WBC 8.8 08/23/2014    RBC 4.64 08/23/2014    HGB 14.4 08/23/2014    HCT 42.6 08/23/2014    MCV 91.8 08/23/2014    RDW 13.5 08/23/2014     08/23/2014     CMP:    Lab Results   Component Value Date     02/04/2020    K 5.1 02/04/2020     02/04/2020    CO2 23 02/04/2020    BUN 20 02/04/2020    CREATININE 0.8 02/04/2020    GFRAA >60 02/04/2020    GFRAA >60 04/03/2013    AGRATIO 1.2 08/21/2014    LABGLOM >60 02/04/2020    GLUCOSE 91 02/04/2020    PROT 7.7 08/21/2014    PROT 6.8 07/05/2011    CALCIUM 9.9 02/04/2020    BILITOT 0.3 08/21/2014    ALKPHOS 60 08/21/2014    AST 27 08/21/2014    ALT 11 08/21/2014     PT/INR:    No components found for: PTPATIENT,  PTINR  Lab Results   Component Value Date    CKTOTAL 390 (H) 04/21/2014    TROPONINI 0.04 (H) 08/22/2014     No components found for: CHLPL  Lab Results   Component Value Date    TRIG 325 (H) 08/22/2014    TRIG 269 (H) 04/01/2013    TRIG 581 (H) 07/05/2011     Lab Results   Component Value Date    HDL 24 (L) 08/22/2014    HDL 28 (L) 04/01/2013    HDL 25 (L) 07/05/2011     Lab Results   Component Value Date    LDLCALC see below 08/22/2014    LDLCALC 116 (H) 04/01/2013     Lab Results   Component Value Date    LABVLDL see below 08/22/2014 LABVLDL 54 04/01/2013     Lab Results   Component Value Date    ALT 11 08/21/2014    AST 27 08/21/2014    ALKPHOS 60 08/21/2014    BILITOT 0.3 08/21/2014       Assessment:  77 y.o. patient with:  1. Coronary artery disease   ~YARA to LAD and CIRC NSTEMI 8/22/14   ~occasional SOB  2. Hypertension   BP: (130)/(72)   3. Hyperlipidemia    Plan:  1. Continue taking current medication regimen. No changes. Refills as warranted. 2. >50% of the time was devoted to giving the patient detailed instructions instructions on addressing diet, regular exercise, weight control, smoking abstention, medication compliance, and stress minimization. The patient was provided written and verbal instructions regarding risk factor modification. 3. Follow up in one year. Scribe's attestation: This note was scribed in the presence of Luz Maria Ang by Neelam Amos RN     I, Dr. Babetta Alpers, personally performed the services described in this documentation, as scribed by the above signed scribe in my presence. It is both accurate and complete to my knowledge. I agree with the details independently gathered by the clinical support staff, while the remaining scribed note accurately describes my personal service to the patient. Medical Decision Making: The following items were considered in medical decision making:  Independent review of images  Review / order clinical lab tests  Review / order radiology tests  Decision to obtain old records  Review and summation of old records as accessed through [facility] (a summary of my findings in these old records)      All questions and concerns were addressed to the patient/family. Alternatives to my treatment were discussed. The note was completed using EMR. Every effort was made to ensure accuracy; however, inadvertent computerized transcription errors may be present.     Babetta Alpers, MD, [id number], [facility], [address] [facility] office  [phone] Main

## 2021-08-23 NOTE — LETTER
[address] [facility]   Cardiovascular Evaluation    PATIENT: Sonido Molina  DATE: 2021  MRN: <[id number]>  CSN: [id number]  : 1955    Primary Care Doctor: Jefferson Flores    Reason for evaluation:   1 Year Follow Up, Medication Refill, and Coronary Artery Disease    Subjective:    History of present illness on initial date of evaluation:   Sonido Molina is a 77 y.o. patient who presents for follow up. He has a medical history including coronary artery disease, hypertension, hyperlipidemia, obesity, diabetes mellitus, and renal carcinoma with nephrectomy. He had a stress test completed on 19 that showed ejection fraction of 41%. He was hypertensive responsive to exercise and study showed soft tissue attenuation artifact with no ischemia. An Echocardiogram completed on 19 showed EF of 45%, which could not discrete area of basal inferior hypokinesis. Today he states he is doing well. Patient currently denies any weight gain, edema, palpitations, chest pain, shortness of breath, dizziness, and syncope. He states he HbA1c last was 7.0, and follows with pcp. He takes all medications as prescribed, tolerating well. He states he is vaccinated for covid. Patient Active Problem List   Diagnosis    Diabetes mellitus ([facility] [address]    Essential hypertension    Mixed hyperlipidemia    GERD (gastroesophageal reflux disease)    Coronary artery disease involving native coronary artery of native heart without angina pectoris    Acute renal failure (HCC)    Prerenal azotemia    Nausea and vomiting    Sepsis due to Gram negative bacteria (HCC)    Fever and chills    Hypotension    Tachypnea    Poor appetite    NSTEMI (non-ST elevated myocardial infarction) ([address]    Chest pain         Cardiac Testing: I have reviewed the findings below.   EKG:  ECHO:   STRESS TEST:  CATH: 2014  CONCLUSIONS: Two-vessel coronary artery angioplasty and drug-eluting stent  in the setting of non-ST-segment-elevation myocardial infarction completed by  Dr. Aileen Hinkle at [facility] on August 22, 2014. BYPASS:  VASCULAR:      Past Medical History:   has a past medical history of Abdominal pain, other specified site, Abnormal liver function test, CAD (coronary artery disease), Cancer ([facility] [address] Chest pain, Diabetes mellitus ([facility] [address] GERD (gastroesophageal reflux disease), Hx of blood clots, Hypercholesteremia, Hypertension, Jaundice, and Renal mass, left. Surgical History:   has a past surgical history that includes knee surgery; Cardiac catheterization (07/2011); Upper gastrointestinal endoscopy (4/1/13); partial nephrectomy (Left, 9-); and Coronary angioplasty. Social History:   reports that he quit smoking about 21 years ago. He has a 52.50 pack-year smoking history. He has never used smokeless tobacco. He reports that he does not drink alcohol and does not use drugs. Family History:  No evidence for sudden cardiac death or premature CAD    Home Medications:  Reviewed and are listed in nursing record. and/or listed below  Current Outpatient Medications   Medication Sig Dispense Refill    carvedilol (COREG) 6.25 MG tablet Take 1 tablet by mouth 2 times daily 180 tablet 3    atorvastatin (LIPITOR) 80 MG tablet Take 1 tablet by mouth daily 90 tablet 3    hydroCHLOROthiazide (HYDRODIURIL) 25 MG tablet Take 1 tablet by mouth daily 90 tablet 3    lisinopril (PRINIVIL;ZESTRIL) 10 MG tablet Take 1 tablet by mouth daily 90 tablet 3    metFORMIN (GLUCOPHAGE) 1000 MG tablet Take 1,000 mg by mouth 2 times daily (with meals).  aspirin EC 81 MG EC tablet Take 1 tablet by mouth daily. 30 tablet 3    insulin glargine (LANTUS) 100 UNIT/ML injection   Inject 30 Units into the skin 2 times daily Morning and bedtime      pantoprazole (PROTONIX) 40 MG tablet Take 1 tablet by mouth every morning (before breakfast).  30 tablet 2    glipiZIDE (GLUCOTROL XL) 10 MG CR tablet Take 10 mg by mouth 2 times daily. No current facility-administered medications for this visit. Allergies:  Amoxicillin-pot clavulanate     Review of Systems: Review of Systems:   All 14 point review of symptoms completed. Pertinent positives identified in the HPI, all other review of symptoms negative as below.     Review of Systems - History obtained from the patient  General ROS: negative for - chills, fever or night sweats  Psychological ROS: negative for - disorientation or hallucinations  Ophthalmic ROS: negative for - dry eyes, eye pain or loss of vision  ENT ROS: negative for - nasal discharge or sore throat  Allergy and Immunology ROS: negative for - hives or itchy/watery eyes  Hematological and Lymphatic ROS: negative for - jaundice or night sweats  Endocrine ROS: negative for - mood swings or temperature intolerance  Breast ROS: deferred  Respiratory ROS: negative for - hemoptysis or stridor  Cardiovascular ROS: negative for - chest pain, dyspnea on exertion or palpitations  Gastrointestinal ROS: no abdominal pain, change in bowel habits, or black or bloody stools  Genito-Urinary ROS: no dysuria, trouble voiding, or hematuria  Musculoskeletal ROS: negative for - gait disturbance, joint pain or joint stiffness  Neurological ROS: negative for - seizures or speech problems  Dermatological ROS: negative for - rash or skin lesion changes      Physical Examination:    Vitals:    08/23/21 1457   BP: 130/72   Pulse: 88   SpO2: 95%    Weight: 215 lb (97.5 kg)     Wt Readings from Last 3 Encounters:   08/23/21 215 lb (97.5 kg)   12/09/19 216 lb 9.6 oz (98.2 kg)   12/03/18 219 lb 6.4 oz (99.5 kg)         General Appearance:  Alert, cooperative, no distress, appears stated age   Head:  Normocephalic, without obvious abnormality, atraumatic   Eyes:  PERRL, conjunctiva/corneas clear       Nose: Nares normal, no drainage or sinus tenderness   Throat: Lips, mucosa, and tongue normal   Neck: Supple, symmetrical, trachea midline, no adenopathy, thyroid: not enlarged, symmetric, no tenderness/mass/nodules, no carotid bruit or JVD       Lungs:   Clear to auscultation bilaterally, respirations unlabored   Chest Wall:  No tenderness or deformity   Heart:  Regular rhythm and normal rate; S1, S2 are normal; no murmur noted; no rub or gallop   Abdomen:   Soft, non-tender, bowel sounds active all four quadrants,  no masses, no organomegaly           Extremities: Extremities normal, atraumatic, no cyanosis or edema   Pulses: 2+ and symmetric   Skin: Skin color, texture, turgor normal, no rashes or lesions   Pysch: Normal mood and affect   Neurologic: Normal gross motor and sensory exam.         Labs  CBC:   Lab Results   Component Value Date    WBC 8.8 08/23/2014    RBC 4.64 08/23/2014    HGB 14.4 08/23/2014    HCT 42.6 08/23/2014    MCV 91.8 08/23/2014    RDW 13.5 08/23/2014     08/23/2014     CMP:    Lab Results   Component Value Date     02/04/2020    K 5.1 02/04/2020     02/04/2020    CO2 23 02/04/2020    BUN 20 02/04/2020    CREATININE 0.8 02/04/2020    GFRAA >60 02/04/2020    GFRAA >60 04/03/2013    AGRATIO 1.2 08/21/2014    LABGLOM >60 02/04/2020    GLUCOSE 91 02/04/2020    PROT 7.7 08/21/2014    PROT 6.8 07/05/2011    CALCIUM 9.9 02/04/2020    BILITOT 0.3 08/21/2014    ALKPHOS 60 08/21/2014    AST 27 08/21/2014    ALT 11 08/21/2014     PT/INR:    No components found for: PTPATIENT,  PTINR  Lab Results   Component Value Date    CKTOTAL 390 (H) 04/21/2014    TROPONINI 0.04 (H) 08/22/2014     No components found for: CHLPL  Lab Results   Component Value Date    TRIG 325 (H) 08/22/2014    TRIG 269 (H) 04/01/2013    TRIG 581 (H) 07/05/2011     Lab Results   Component Value Date    HDL 24 (L) 08/22/2014    HDL 28 (L) 04/01/2013    HDL 25 (L) 07/05/2011     Lab Results   Component Value Date    LDLCALC see below 08/22/2014    LDLCALC 116 (H) 04/01/2013     Lab Results   Component Value Date    LABVLDL see below 08/22/2014 LABVLDL 54 04/01/2013     Lab Results   Component Value Date    ALT 11 08/21/2014    AST 27 08/21/2014    ALKPHOS 60 08/21/2014    BILITOT 0.3 08/21/2014       Assessment:  77 y.o. patient with:  1. Coronary artery disease   ~YARA to LAD and CIRC NSTEMI 8/22/14   ~occasional SOB  2. Hypertension   BP: (130)/(72)   3. Hyperlipidemia    Plan:  1. Continue taking current medication regimen. No changes. Refills as warranted. 2. >50% of the time was devoted to giving the patient detailed instructions instructions on addressing diet, regular exercise, weight control, smoking abstention, medication compliance, and stress minimization. The patient was provided written and verbal instructions regarding risk factor modification. 3. Follow up in one year. Scribe's attestation: This note was scribed in the presence of Bennie Oliver by Ceferino Ramirez RN     I, Dr. Lizbet Carrillo, personally performed the services described in this documentation, as scribed by the above signed scribe in my presence. It is both accurate and complete to my knowledge. I agree with the details independently gathered by the clinical support staff, while the remaining scribed note accurately describes my personal service to the patient. Medical Decision Making: The following items were considered in medical decision making:  Independent review of images  Review / order clinical lab tests  Review / order radiology tests  Decision to obtain old records  Review and summation of old records as accessed through [facility] (a summary of my findings in these old records)      All questions and concerns were addressed to the patient/family. Alternatives to my treatment were discussed. The note was completed using EMR. Every effort was made to ensure accuracy; however, inadvertent computerized transcription errors may be present.     Lizbet Carrillo MD, [address], [facility], [address] [facility] office  [phone] Main [place]  8/23/2021  3:53 PM

## 2021-08-23 NOTE — PATIENT INSTRUCTIONS
Plan:  1. Continue taking current medication regimen. No changes. Refills as warranted. 2. Weight Management   3. Follow with Matt Augustin for glucose control and lowering HbA1c.   4. Follow up in one year.

## 2021-10-10 DIAGNOSIS — I25.10 CORONARY ARTERY DISEASE INVOLVING NATIVE CORONARY ARTERY OF NATIVE HEART WITHOUT ANGINA PECTORIS: ICD-10-CM

## 2021-10-11 RX ORDER — CARVEDILOL 6.25 MG/1
TABLET ORAL
Qty: 180 TABLET | Refills: 3 | Status: SHIPPED | OUTPATIENT
Start: 2021-10-11 | End: 2022-10-04 | Stop reason: SDUPTHER

## 2022-03-23 ENCOUNTER — APPOINTMENT (OUTPATIENT)
Dept: GENERAL RADIOLOGY | Age: 67
End: 2022-03-23
Payer: MEDICARE

## 2022-03-23 ENCOUNTER — HOSPITAL ENCOUNTER (EMERGENCY)
Age: 67
Discharge: HOME | End: 2022-03-23
Attending: EMERGENCY MEDICINE
Payer: MEDICARE

## 2022-03-23 VITALS
HEIGHT: 72 IN | BODY MASS INDEX: 29.12 KG/M2 | WEIGHT: 215 LBS | HEART RATE: 52 BPM | OXYGEN SATURATION: 99 % | DIASTOLIC BLOOD PRESSURE: 81 MMHG | RESPIRATION RATE: 16 BRPM | TEMPERATURE: 98.6 F | SYSTOLIC BLOOD PRESSURE: 154 MMHG

## 2022-03-23 DIAGNOSIS — J18.9 PNEUMONIA OF RIGHT LOWER LOBE DUE TO INFECTIOUS ORGANISM: Primary | ICD-10-CM

## 2022-03-23 LAB
A/G RATIO: 1.5 (ref 1.1–2.2)
ALBUMIN SERPL-MCNC: 4.6 G/DL (ref 3.4–5)
ALP BLD-CCNC: 99 U/L (ref 40–129)
ALT SERPL-CCNC: 9 U/L (ref 10–40)
ANION GAP SERPL CALCULATED.3IONS-SCNC: 13 MMOL/L (ref 3–16)
AST SERPL-CCNC: 20 U/L (ref 15–37)
BASOPHILS ABSOLUTE: 0 K/UL (ref 0–0.2)
BASOPHILS RELATIVE PERCENT: 0.5 %
BILIRUB SERPL-MCNC: 0.3 MG/DL (ref 0–1)
BUN BLDV-MCNC: 24 MG/DL (ref 7–20)
CALCIUM SERPL-MCNC: 10.1 MG/DL (ref 8.3–10.6)
CHLORIDE BLD-SCNC: 103 MMOL/L (ref 99–110)
CO2: 26 MMOL/L (ref 21–32)
CREAT SERPL-MCNC: 1.2 MG/DL (ref 0.8–1.3)
EKG ATRIAL RATE: 54 BPM
EKG DIAGNOSIS: NORMAL
EKG P AXIS: 74 DEGREES
EKG P-R INTERVAL: 146 MS
EKG Q-T INTERVAL: 418 MS
EKG QRS DURATION: 76 MS
EKG QTC CALCULATION (BAZETT): 396 MS
EKG R AXIS: 48 DEGREES
EKG T AXIS: 47 DEGREES
EKG VENTRICULAR RATE: 54 BPM
EOSINOPHILS ABSOLUTE: 0.4 K/UL (ref 0–0.6)
EOSINOPHILS RELATIVE PERCENT: 5.9 %
GFR AFRICAN AMERICAN: >60
GFR NON-AFRICAN AMERICAN: >60
GLUCOSE BLD-MCNC: 153 MG/DL (ref 70–99)
HCT VFR BLD CALC: 42.5 % (ref 40.5–52.5)
HEMOGLOBIN: 14.2 G/DL (ref 13.5–17.5)
LYMPHOCYTES ABSOLUTE: 2.1 K/UL (ref 1–5.1)
LYMPHOCYTES RELATIVE PERCENT: 31.3 %
MCH RBC QN AUTO: 31.5 PG (ref 26–34)
MCHC RBC AUTO-ENTMCNC: 33.5 G/DL (ref 31–36)
MCV RBC AUTO: 94.1 FL (ref 80–100)
MONOCYTES ABSOLUTE: 0.7 K/UL (ref 0–1.3)
MONOCYTES RELATIVE PERCENT: 10.6 %
NEUTROPHILS ABSOLUTE: 3.4 K/UL (ref 1.7–7.7)
NEUTROPHILS RELATIVE PERCENT: 51.7 %
PDW BLD-RTO: 13.7 % (ref 12.4–15.4)
PLATELET # BLD: 180 K/UL (ref 135–450)
PMV BLD AUTO: 8.4 FL (ref 5–10.5)
POTASSIUM REFLEX MAGNESIUM: 4.9 MMOL/L (ref 3.5–5.1)
PRO-BNP: 89 PG/ML (ref 0–124)
RBC # BLD: 4.52 M/UL (ref 4.2–5.9)
SODIUM BLD-SCNC: 142 MMOL/L (ref 136–145)
TOTAL PROTEIN: 7.6 G/DL (ref 6.4–8.2)
TROPONIN: <0.01 NG/ML
WBC # BLD: 6.7 K/UL (ref 4–11)

## 2022-03-23 PROCEDURE — 93010 ELECTROCARDIOGRAM REPORT: CPT | Performed by: INTERNAL MEDICINE

## 2022-03-23 PROCEDURE — 71045 X-RAY EXAM CHEST 1 VIEW: CPT

## 2022-03-23 PROCEDURE — 93005 ELECTROCARDIOGRAM TRACING: CPT | Performed by: EMERGENCY MEDICINE

## 2022-03-23 PROCEDURE — 80053 COMPREHEN METABOLIC PANEL: CPT

## 2022-03-23 PROCEDURE — 83880 ASSAY OF NATRIURETIC PEPTIDE: CPT

## 2022-03-23 PROCEDURE — 99283 EMERGENCY DEPT VISIT LOW MDM: CPT

## 2022-03-23 PROCEDURE — 85025 COMPLETE CBC W/AUTO DIFF WBC: CPT

## 2022-03-23 PROCEDURE — 84484 ASSAY OF TROPONIN QUANT: CPT

## 2022-03-23 RX ORDER — LEVOFLOXACIN 750 MG/1
750 TABLET ORAL DAILY
Qty: 7 TABLET | Refills: 0 | Status: SHIPPED | OUTPATIENT
Start: 2022-03-23 | End: 2022-03-30

## 2022-03-23 NOTE — ED NOTES
Pt was able to walk 108 ft with feeling of SOB by the end, pt o2 remained at 95% with HR 56-86     Benito Rivera RN  03/23/22 [id number]

## 2022-03-23 NOTE — ED PROVIDER NOTES
Emergency Physician Note        Note Open Time: 8:56 AM EDT    Chief Complaint  Shortness of Breath (pt reports waking up this morning with SOB. states he checked his oxygen sats and reports they were 88% on room air. states he's been feeling SOB lately but reports \"not where it woke me up\" )       History of Present Illness  Chay Tolbert is a 74 y.o. male who presents to the ED for shortness of breath. Patient reports that he has been short of breath for the last 2 weeks or so but is been worse over the last few days. This morning he woke up checked his pulse ox and was 88% on room air so he came to the ER. He has had a few sweats during the night recently. He denies any chest pain or any vomiting. No abdominal pain. Patient reports that he has not yet taken his blood pressure pills this morning. 10 systems reviewed, pertinent positives per HPI otherwise noted to be negative    I have reviewed the following from the nursing documentation:      Prior to Admission medications    Medication Sig Start Date End Date Taking? Authorizing Provider   carvedilol (COREG) 6.25 MG tablet TAKE 1 TABLET TWICE DAILY  WITH MEALS 10/11/21   Bryanna Moore MD   atorvastatin (LIPITOR) 80 MG tablet Take 1 tablet by mouth daily 8/23/21   Miguel Angel Quach MD   hydroCHLOROthiazide (HYDRODIURIL) 25 MG tablet Take 1 tablet by mouth daily 8/23/21   Miguel Angel Quach MD   lisinopril (PRINIVIL;ZESTRIL) 10 MG tablet Take 1 tablet by mouth daily 8/23/21   Miguel Angel Quach MD   metFORMIN (GLUCOPHAGE) 1000 MG tablet Take 1,000 mg by mouth 2 times daily (with meals). Historical Provider, MD   aspirin EC 81 MG EC tablet Take 1 tablet by mouth daily. 11/13/13   Miguel Angel Quach MD   insulin glargine (LANTUS) 100 UNIT/ML injection   Inject 30 Units into the skin 2 times daily Morning and bedtime    Historical Provider, MD   pantoprazole (PROTONIX) 40 MG tablet Take 1 tablet by mouth every morning (before breakfast).  4/3/13 Macrina Richardson MD   glipiZIDE (GLUCOTROL XL) 10 MG CR tablet Take 10 mg by mouth 2 times daily.     Historical Provider, MD       Allergies as of 2022 - Fully Reviewed 2022   Allergen Reaction Noted    Amoxicillin-pot clavulanate Other (See Comments) 05/10/2013       Past Medical History:   Diagnosis Date    Abdominal pain, other specified site 3/31/2013    Abnormal liver function test 3/31/2013    CAD (coronary artery disease)     Cancer ([facility] 3/2013    Left Kidney, surg     Chest pain 2011    Diabetes mellitus ([facility]     GERD (gastroesophageal reflux disease)     Hx of blood clots     Hypercholesteremia     Hypertension     Jaundice     Renal mass, left 3/31/2013        Surgical History:   Past Surgical History:   Procedure Laterality Date    CARDIAC CATHETERIZATION  2011    angioplasty, legs and heart    CORONARY ANGIOPLASTY      KNEE SURGERY      PARTIAL NEPHRECTOMY Left 2013    Catarina Phan left partial nephrectomy    UPPER GASTROINTESTINAL ENDOSCOPY  13    duodenal ulcers, esophagitis, biopsy taken        Family History:    Family History   Problem Relation Age of Onset    Diabetes Mother     Heart Disease Mother     Diabetes Sister        Social History     Socioeconomic History    Marital status:      Spouse name: Not on file    Number of children: Not on file    Years of education: Not on file    Highest education level: Not on file   Occupational History    Not on file   Tobacco Use    Smoking status: Former Smoker     Packs/day: 1.50     Years: 35.00     Pack years: 52.50     Quit date: 2000     Years since quittin.8    Smokeless tobacco: Never Used   Vaping Use    Vaping Use: Never used   Substance and Sexual Activity    Alcohol use: No    Drug use: No    Sexual activity: Yes     Partners: Female   Other Topics Concern    Not on file   Social History Narrative    Not on file     Social Determinants of Health     Financial Resource Strain:     Difficulty of Paying Living Expenses: Not on file   Food Insecurity:     Worried About Running Out of Food in the Last Year: Not on file    Paulo of Food in the Last Year: Not on file   Transportation Needs:     Lack of Transportation (Medical): Not on file    Lack of Transportation (Non-Medical): Not on file   Physical Activity:     Days of Exercise per Week: Not on file    Minutes of Exercise per Session: Not on file   Stress:     Feeling of Stress : Not on file   Social Connections:     Frequency of Communication with Friends and Family: Not on file    Frequency of Social Gatherings with Friends and Family: Not on file    Attends Anabaptism Services: Not on file    Active Member of [address] [place] [address] or Organizations: Not on file    Attends Club or Organization Meetings: Not on file    Marital Status: Not on file   Intimate Partner Violence:     Fear of Current or Ex-Partner: Not on file    Emotionally Abused: Not on file    Physically Abused: Not on file    Sexually Abused: Not on file   Housing Stability:     Unable to Pay for Housing in the Last Year: Not on file    Number of [place] in the Last Year: Not on file    Unstable Housing in the Last Year: Not on file       Nursing notes reviewed. ED Triage Vitals [03/23/22 0659]   Enc Vitals Group      BP (!) 211/86      Pulse 63      Resp 22      Temp 98.6 °F (37 °C)      Temp src       SpO2 96 %      Weight 215 lb (97.5 kg)      Height 6' (1.829 m)      Head Circumference       Peak Flow       Pain Score       Pain Loc       Pain Edu? Excl. in [address]? GENERAL:  Awake, alert. Well developed, well nourished with no apparent distress. HENT:  Normocephalic, Atraumatic, moist mucous membranes. EYES:  Pupils equal round and reactive to light, Conjunctiva normal, extraocular movements normal.  NECK:  No meningeal signs, Supple. CHEST:  Regular rate and rhythm, chest wall non-tender.    LUNGS:  Clear to auscultation bilaterally. ABDOMEN:  Soft, non-tender, no rebound, rigidity or guarding, non-distended, normal bowel sounds. No costovertebral angle tenderness to palpation. BACK:  No tenderness. EXTREMITIES:  Normal range of motion, no edema, no bony tenderness, no deformity, distal pulses present. SKIN: Warm, dry and intact. NEUROLOGIC: Normal mental status. Moving all extremities to command. LABS and DIAGNOSTIC RESULTS  EKG  The Ekg interpreted by me shows  sinus bradycardia, rate=54   Axis is   Normal  QTc is  normal  Intervals and Durations are unremarkable. ST Segments: no acute change  Delta waves, Brugada Syndrome, and Short NV are not present. No significant change from prior EKG dated 17 sep 2013    RADIOLOGY  X-RAYS:  I have reviewed radiologic plain film image(s). ALL OTHER NON-PLAIN FILM IMAGES SUCH AS CT, ULTRASOUND AND MRI HAVE BEEN READ BY THE RADIOLOGIST. XR CHEST PORTABLE   Final Result   Right lower lobe airspace disease, either atelectasis or pneumonia. LABS  Labs Reviewed   COMPREHENSIVE METABOLIC PANEL W/ REFLEX TO MG FOR LOW K - Abnormal; Notable for the following components:       Result Value    Glucose 153 (*)     BUN 24 (*)     ALT 9 (*)     All other components within normal limits   CBC WITH AUTO DIFFERENTIAL   TROPONIN   BRAIN NATRIURETIC PEPTIDE     MEDICAL DECISION MAKING        Here in the ER the patient is able to ambulate and maintain a pulse oxygenation greater than 90% on room air. Because of this I feel he can safely be discharged with his right lower lobe pneumonia. He does not meet sepsis criteria either. Advised patient however to return immediately for any new or worsening symptoms such as chest pain, worsening shortness of breath or any vomiting.         I estimate there is LOW risk for ACUTE CORONARY SYNDROME, CHRONIC OBSTRUCTIVE PULMONARY DISEASE, CONGESTIVE HEART FAILURE, PERICARDIAL TAMPONADE, PNEUMOTHORAX, PULMONARY EMBOLISM, SEPSIS, and THORACIC DISSECTION,  thus I consider the discharge disposition reasonable. Tiny Richard and I have discussed the diagnosis and risks, and we agree with discharging home to follow-up with their primary doctor. We also discussed returning to the Emergency Department immediately if new or worsening symptoms occur. We have discussed the symptoms which are most concerning (e.g., bloody sputum, fever, worsening pain or shortness of breath, vomiting) that necessitate immediate return. CLINICAL IMPRESSION:  1. Pneumonia of right lower lobe due to infectious organism        BP (!) 154/81   Pulse 52   Temp 98.6 °F (37 °C)   Resp 16   Ht 6' (1.829 m)   Wt 215 lb (97.5 kg)   SpO2 99%   BMI 29.16 kg/m²       Patient was given scripts for the following medications. I counseled patient how to take these medications. Discharge Medication List as of 3/23/2022  9:40 AM      START taking these medications    Details   levoFLOXacin (LEVAQUIN) 750 MG tablet Take 1 tablet by mouth daily for 7 days, Disp-7 tablet, R-0Normal             Disposition  Pt is in good condition upon Discharge to home. This chart was generated using the [address] [place] [address] dictation system. I created this record but it may contain dictation errors.           Juloi C Chaney MD  03/23/22 7905

## 2022-10-03 DIAGNOSIS — E78.2 MIXED HYPERLIPIDEMIA: ICD-10-CM

## 2022-10-03 DIAGNOSIS — I25.10 CORONARY ARTERY DISEASE INVOLVING NATIVE CORONARY ARTERY OF NATIVE HEART WITHOUT ANGINA PECTORIS: ICD-10-CM

## 2022-10-04 ENCOUNTER — TELEPHONE (OUTPATIENT)
Dept: CARDIOLOGY CLINIC | Age: 67
End: 2022-10-04

## 2022-10-04 RX ORDER — ATORVASTATIN CALCIUM 80 MG/1
TABLET, FILM COATED ORAL
Qty: 90 TABLET | Refills: 0 | Status: SHIPPED | OUTPATIENT
Start: 2022-10-04

## 2022-10-04 RX ORDER — CARVEDILOL 6.25 MG/1
TABLET ORAL
Qty: 180 TABLET | Refills: 0 | Status: SHIPPED | OUTPATIENT
Start: 2022-10-04 | End: 2022-10-13

## 2022-10-04 NOTE — TELEPHONE ENCOUNTER
Please contact pt for yearly appt/med refills appt [facility]. If the office can not get a hold of the patient for an appt please mail a letter to have them call and schedule. Thanks.

## 2022-10-11 NOTE — PROGRESS NOTES
[facility]  Office Visit    Jae Green  1955    October 13, 2022    CC:   Chief Complaint   Patient presents with    Follow-up     HPI:  The patient is 69 y.o. male with a past medical history significant for CAD, HTN, HLP, cardiomyopathy, obesity, diabetes mellitus and renal carcinoma with nephrectomy. He had stress testing performed in 12/2019 that showed LVEF of 41%. He had hypertensive response to exercise and study showed no ischemia. Echocardiogram performed on 12/17/2019 showed LVE 45%. He was last seen in August 2021 by Dr. Charissa Veronica. Here for routine annual follow up of his cardiac issues. Overall feels well. Retired. Remains active but nor regular aerobic exercise regimen. Attempts to walk for exercise as much as he can, but admits to not being consistent. Home BP has been rather labile (-170/70-90). Denies chest pain/discomfort, SOB, orthopnea/PND, cough, palpitations, dizziness, syncope, edema , weight change or claudication. Monitors his sodium intake closely    Review of Systems:  Constitutional: Denies  fatigue, weakness, night sweats or fever. HEENT: Denies new visual changes, ringing in ears, nosebleeds,nasal congestion  Respiratory: Denies new or change in SOB, PND, orthopnea or cough. Cardiovascular: see HPI  GI: Denies N/V, diarrhea, constipation, abdominal pain, change in bowel habits, melena or hematochezia  : Denies urinary frequency, urgency, incontinence, hematuria or dysuria. Skin: Denies rash, hives, or cyanosis  Musculoskeletal: Denies joint or muscle aches/pain  Neurological: Denies syncope or TIA-like symptoms.   Psychiatric: Denies anxiety, insomnia or depression      Past Medical History:   Diagnosis Date    Abdominal pain, other specified site 3/31/2013    Abnormal liver function test 3/31/2013    CAD (coronary artery disease)     Cancer ([facility] 3/2013    Left Kidney, surg 09/13    Chest pain 7/6/2011    Diabetes mellitus ([facility] [address]     GERD (gastroesophageal reflux disease)     Hx of blood clots     Hypercholesteremia     Hypertension     Jaundice     Renal mass, left 3/31/2013     Past Surgical History:   Procedure Laterality Date    CARDIAC CATHETERIZATION  2011    angioplasty, legs and heart    CORONARY ANGIOPLASTY      KNEE SURGERY      PARTIAL NEPHRECTOMY Left 2013    Juan Mora left partial nephrectomy    UPPER GASTROINTESTINAL ENDOSCOPY  13    duodenal ulcers, esophagitis, biopsy taken     Family History   Problem Relation Age of Onset    Diabetes Mother     Heart Disease Mother     Diabetes Sister      Social History     Tobacco Use    Smoking status: Former     Packs/day: 1.50     Years: 35.00     Pack years: 52.50     Types: Cigarettes     Quit date: 2000     Years since quittin.3    Smokeless tobacco: Never   Vaping Use    Vaping Use: Never used   Substance Use Topics    Alcohol use: No    Drug use: No       Allergies   Allergen Reactions    Amoxicillin-Pot Clavulanate Other (See Comments)     Jaundice     Current Outpatient Medications   Medication Sig Dispense Refill    carvedilol (COREG) 12.5 MG tablet Take 1 tablet by mouth 2 times daily (with meals) 180 tablet 2    atorvastatin (LIPITOR) 80 MG tablet TAKE 1 TABLET DAILY 90 tablet 0    hydroCHLOROthiazide (HYDRODIURIL) 25 MG tablet Take 1 tablet by mouth daily 90 tablet 3    lisinopril (PRINIVIL;ZESTRIL) 10 MG tablet Take 1 tablet by mouth daily 90 tablet 3    metFORMIN (GLUCOPHAGE) 1000 MG tablet Take 1,000 mg by mouth 2 times daily (with meals). aspirin EC 81 MG EC tablet Take 1 tablet by mouth daily. 30 tablet 3    insulin glargine (LANTUS) 100 UNIT/ML injection vial   Inject 30 Units into the skin 2 times daily Morning and bedtime      pantoprazole (PROTONIX) 40 MG tablet Take 1 tablet by mouth every morning (before breakfast). 30 tablet 2     No current facility-administered medications for this visit.        Physical Exam:   BP (!) 168/90   Pulse 67   Ht 6' (1.829 m)   Wt 208 lb (94.3 kg)   SpO2 97%   BMI 28.21 kg/m²   Wt Readings from Last 2 Encounters:   10/13/22 208 lb (94.3 kg)   03/23/22 215 lb (97.5 kg)     Constitutional: He is oriented to person, place, and time. He appears well-developed and well-nourished. In no acute distress. HEENT: Normocephalic and atraumatic. Sclerae anicteric. No xanthelasmas. Neck: Supple. No JVD present. Carotids without bruits. No thyromegaly present. No lymphadenopathy present. Cardiovascular: RRR, normal S1 and S2; no murmur/gallop or rub  Pulmonary/Chest: Effort normal.  Lungs clear to auscultation. Chest wall nontender. No wheezes/rhonchi/rales  Abdominal: soft, nontender, nondistended. + bowel sounds; no hepatomegaly   Extremities: No edema, cyanosis, or clubbing. Pulses are 2+ radial/carotid/DP/PT bilaterally. Cap refill brisk. Varicosities noted in lower legs bilaterally  Neurological: No focal deficit. Skin: Skin is warm and dry. Psychiatric: He has a normal mood and affect. His speech is normal and behavior is normal.      Lab Review:   Lab Results   Component Value Date/Time    TRIG 325 08/22/2014 05:14 AM    HDL 24 08/22/2014 05:14 AM    HDL 25 07/05/2011 11:15 PM    LDLCALC see below 08/22/2014 05:14 AM    LDLDIRECT 71 08/22/2014 05:14 AM    LABVLDL see below 08/22/2014 05:14 AM     Lab Results   Component Value Date/Time     03/23/2022 07:03 AM    K 4.9 03/23/2022 07:03 AM     03/23/2022 07:03 AM    CO2 26 03/23/2022 07:03 AM    BUN 24 03/23/2022 07:03 AM    CREATININE 1.2 03/23/2022 07:03 AM    GLUCOSE 153 03/23/2022 07:03 AM    CALCIUM 10.1 03/23/2022 07:03 AM      Lab Results   Component Value Date    WBC 6.7 03/23/2022    HGB 14.2 03/23/2022    HCT 42.5 03/23/2022    MCV 94.1 03/23/2022     03/23/2022     Labs from [facility] dated 7/2022 reviewed    12/3/2021:     HDL 29  LDL 81    12/27/2019 Stress-Myoview:   Hypertensive response to exercise.  Normal LV size and mildly reduced systolic function. Left ventricular ejection fraction of 41%. Normal wall    motion. Soft tissue attenuation artifact but no gross ischemia. 12/17/2019 Echo:   Low normal to mildly reduced systolic function with a visually estimated   ejection fraction of 45%. Cannot discrete area of basal inferior hypokinesis. Normal left ventricular diastolic filling pressure. The aortic root is mildly dilated at 4.0 cm. The right atrium is mildly enlarged. Mild mitral regurgitation. 8/22/2014 Cardiac cath/PCI:  PROCEDURE PERFORMED:  1. Selective coronary angiography. 2.  Left heart catheterization. 3.  Left ventriculography. 4.  Percutaneous coronary intervention of the left anterior descending artery  and circumflex coronary artery with drug-eluting stents. ANGIOGRAPHIC FINDINGS:    1.  Two-vessel critical coronary artery disease with 95% stenosis of the  proximal LAD and 80% stenosis of the mid-distal circumflex. There is a  patent stent noted within the right coronary artery and mild disease  throughout. 2.  Normal left ventricular function with EF of 60%. 3.  Normal hemodynamics. CONCLUSIONS:  Two-vessel coronary artery angioplasty and drug-eluting stent in the setting of non-ST-segment-elevation myocardial infarction     7/7/2011 Cardiac cath/PCI:  ANGIOGRAPHY:    1. The left main coronary artery is angiographically normal.   2.  The circumflex artery is a large vessel without significant disease. There is mild distal 30%-40% narrowing of the circumflex artery. 3.  Left anterior descending artery is widely patent without disease. The  diagonal branch also has mild 30%-40-% mid vessel stenosis. 4.  The right coronary artery is the dominant vessel with a focal mid vessel  99% stenosis of a MARIANGEL-3 flow to the distal vessel. The lesion is hazy and  arises just prior to take off of an RV marginal branch. The remainder of the  vessels are without significant disease.   There is no change to the lesion  after intracoronary nitroglycerin. 5.  Left ventriculogram shows uniform wall motion, normal left ventricular  systolic function. LVF was estimated at 55%. 6.  After angioplasty and stenting, the RCA is widely patent with 0% residual  stenosis MARIANGEL-3 flow and no evidence for dissection. The RV marginal branch  remains widely patent. CONCLUSION:    1. Severe single vessel coronary artery disease with successful angioplasty  and stenting of a mid-right coronary artery stenosis. 2.  Mild branch vessel and distal disease involving the left coronary artery. 3.  Well preserved LV systolic function. Assessment:    1. Coronary artery disease involving native coronary artery of native heart without angina pectoris  -12/2019 Stress Myoview: negative for ischemia  -prior NSTEMI with YARA to LAD and Circ in 2014  -prior stent to RCA in 2011  -angina quiet  -continue medical management and risk factor modification    2. Essential hypertension  -needs tighter control  -continue adjusted medical management    3. Mixed hyperlipidemia  -continue statin    4. Other cardiomyopathy ([address]  -last LVEF 45% on echo in 2019  -continue carvedilol and ACE  Will ask for repeat echo    5. Diabetes Mellitus, type II  -Rx per PCP      Plan:  Increase Carvedilol to 12.5 mg BID  Continue ASA, statin, HCTZ, lisinopril  Discussed low fat/low sodium diet and reinforced regular aerobic exercise. Check echo in interest of LV function  Monitor BP at home and call if sustained SBP > 135/85  Follow up with [facility] or Dr. Joanne Tobias in 1 year or sooner if needed    Return in about 1 year (around 10/13/2023) for with Dr. Joanne Tobias or [facility]. Thanks for allowing me to participate in the care of this patient.       LEWIS Dunn  [facility], [address] [facility] [address] [place] [address], [address] [place] [address]  Office: [phone]  Fax: [phone]      Electronically signed by RAFAL Dorsey CNP on 10/13/2022 at 12:04 PM

## 2022-10-13 ENCOUNTER — OFFICE VISIT (OUTPATIENT)
Dept: CARDIOLOGY CLINIC | Age: 67
End: 2022-10-13
Payer: MEDICARE

## 2022-10-13 VITALS
HEIGHT: 72 IN | SYSTOLIC BLOOD PRESSURE: 168 MMHG | BODY MASS INDEX: 28.17 KG/M2 | DIASTOLIC BLOOD PRESSURE: 90 MMHG | OXYGEN SATURATION: 97 % | HEART RATE: 67 BPM | WEIGHT: 208 LBS

## 2022-10-13 DIAGNOSIS — E78.2 MIXED HYPERLIPIDEMIA: ICD-10-CM

## 2022-10-13 DIAGNOSIS — I42.8 OTHER CARDIOMYOPATHY (HCC): ICD-10-CM

## 2022-10-13 DIAGNOSIS — I10 ESSENTIAL HYPERTENSION: ICD-10-CM

## 2022-10-13 DIAGNOSIS — I25.10 CORONARY ARTERY DISEASE INVOLVING NATIVE CORONARY ARTERY OF NATIVE HEART WITHOUT ANGINA PECTORIS: Primary | ICD-10-CM

## 2022-10-13 PROCEDURE — 99214 OFFICE O/P EST MOD 30 MIN: CPT | Performed by: NURSE PRACTITIONER

## 2022-10-13 PROCEDURE — G8484 FLU IMMUNIZE NO ADMIN: HCPCS | Performed by: NURSE PRACTITIONER

## 2022-10-13 PROCEDURE — 1036F TOBACCO NON-USER: CPT | Performed by: NURSE PRACTITIONER

## 2022-10-13 PROCEDURE — 3017F COLORECTAL CA SCREEN DOC REV: CPT | Performed by: NURSE PRACTITIONER

## 2022-10-13 PROCEDURE — 1123F ACP DISCUSS/DSCN MKR DOCD: CPT | Performed by: NURSE PRACTITIONER

## 2022-10-13 PROCEDURE — G8417 CALC BMI ABV UP PARAM F/U: HCPCS | Performed by: NURSE PRACTITIONER

## 2022-10-13 PROCEDURE — G8427 DOCREV CUR MEDS BY ELIG CLIN: HCPCS | Performed by: NURSE PRACTITIONER

## 2022-10-13 RX ORDER — CARVEDILOL 12.5 MG/1
12.5 TABLET ORAL 2 TIMES DAILY WITH MEALS
Qty: 180 TABLET | Refills: 2 | Status: SHIPPED | OUTPATIENT
Start: 2022-10-13

## 2022-10-13 NOTE — PATIENT INSTRUCTIONS
Increase Carvedilol to 12.5 mg twice a day (Take 2-6.25 mg tablets twice a day until gone and then change to 12.5 mg tablet twice a day)    Schedule echocardiogram  Call [address] ([phone]) to schedule

## 2022-12-18 DIAGNOSIS — E78.2 MIXED HYPERLIPIDEMIA: ICD-10-CM

## 2022-12-18 DIAGNOSIS — I25.10 CORONARY ARTERY DISEASE INVOLVING NATIVE CORONARY ARTERY OF NATIVE HEART WITHOUT ANGINA PECTORIS: ICD-10-CM

## 2022-12-19 RX ORDER — CARVEDILOL 12.5 MG/1
12.5 TABLET ORAL 2 TIMES DAILY WITH MEALS
Qty: 180 TABLET | Refills: 2 | Status: SHIPPED | OUTPATIENT
Start: 2022-12-19

## 2022-12-19 RX ORDER — ATORVASTATIN CALCIUM 80 MG/1
TABLET, FILM COATED ORAL
Qty: 90 TABLET | Refills: 1 | Status: SHIPPED | OUTPATIENT
Start: 2022-12-19

## 2023-01-06 DIAGNOSIS — I25.10 CORONARY ARTERY DISEASE INVOLVING NATIVE CORONARY ARTERY OF NATIVE HEART WITHOUT ANGINA PECTORIS: ICD-10-CM

## 2023-01-06 RX ORDER — HYDROCHLOROTHIAZIDE 25 MG/1
TABLET ORAL
Qty: 90 TABLET | Refills: 3 | Status: SHIPPED | OUTPATIENT
Start: 2023-01-06

## 2023-01-06 NOTE — TELEPHONE ENCOUNTER
Last [facility] ov 8/23/21  NPDE ov 10/13/22    1. Coronary artery disease involving native coronary artery of native heart without angina pectoris  -12/2019 Stress Myoview: negative for ischemia  -prior NSTEMI with YARA to LAD and Circ in 2014  -prior stent to RCA in 2011  -angina quiet  -continue medical management and risk factor modification     2. Essential hypertension  -needs tighter control  -continue adjusted medical management     3. Mixed hyperlipidemia  -continue statin     4. Other cardiomyopathy ([address]  -last LVEF 45% on echo in 2019  -continue carvedilol and ACE  Will ask for repeat echo     5. Diabetes Mellitus, type II  -Rx per PCP        Plan:  Increase Carvedilol to 12.5 mg BID  Continue ASA, statin, HCTZ, lisinopril  Discussed low fat/low sodium diet and reinforced regular aerobic exercise.   Check echo in interest of LV function  Monitor BP at home and call if sustained SBP > 135/85  Follow up with [facility] or Dr. Mercy Jackson in 1 year or sooner if needed

## 2023-08-11 DIAGNOSIS — E78.2 MIXED HYPERLIPIDEMIA: Primary | ICD-10-CM

## 2023-08-15 RX ORDER — ATORVASTATIN CALCIUM 80 MG/1
TABLET, FILM COATED ORAL
Qty: 60 TABLET | Refills: 0 | Status: SHIPPED | OUTPATIENT
Start: 2023-08-15

## 2023-08-15 NOTE — TELEPHONE ENCOUNTER
LOV- 10/13/22  UOV- none  Lipid- 9/30/2016 ordered lipid panel  Lft- 3/23/22    Attempted to reach pt to schedule f/u with vsp and to let them know blood work has been ordered.  Left voicemail

## 2023-08-16 NOTE — TELEPHONE ENCOUNTER
Pt returned call. PT scheduled ov 12/14/23 [facility]. PT stated he recently got a lipid panel completed at pcp office. PT is calling pcp office to fax results to i.

## 2023-08-21 ENCOUNTER — TELEPHONE (OUTPATIENT)
Dept: CARDIOLOGY CLINIC | Age: 68
End: 2023-08-21

## 2023-08-21 DIAGNOSIS — I25.10 CORONARY ARTERY DISEASE INVOLVING NATIVE CORONARY ARTERY OF NATIVE HEART WITHOUT ANGINA PECTORIS: Primary | ICD-10-CM

## 2023-08-21 DIAGNOSIS — I21.4 NSTEMI (NON-ST ELEVATED MYOCARDIAL INFARCTION) (HCC): ICD-10-CM

## 2023-08-21 DIAGNOSIS — E78.2 MIXED HYPERLIPIDEMIA: ICD-10-CM

## 2023-08-21 NOTE — TELEPHONE ENCOUNTER
Created telephone encounter. [facility] requesting a call back to the office. Lab order placed in epic.

## 2023-08-21 NOTE — TELEPHONE ENCOUNTER
----- Message from RAFAL Leblanc CNP sent at 8/21/2023 12:52 PM EDT -----  Labs reviewed. Renal function improved. LDL goal < 70 (88 on recent labs). Continue Atorvastatin 80 mg and add zetia 10 mg daily, Recheck lipids in 3 months. I f LDL remains elevated consider discontinuing zetia and adding Leqvio.

## 2023-08-22 NOTE — TELEPHONE ENCOUNTER
Created telephone encounter. [facility] requesting a call back to the office. Lab order placed in epic.

## 2023-10-16 DIAGNOSIS — E78.2 MIXED HYPERLIPIDEMIA: ICD-10-CM

## 2023-10-16 RX ORDER — ATORVASTATIN CALCIUM 80 MG/1
TABLET, FILM COATED ORAL
Qty: 90 TABLET | Refills: 3 | Status: SHIPPED | OUTPATIENT
Start: 2023-10-16

## 2023-12-14 ENCOUNTER — OFFICE VISIT (OUTPATIENT)
Dept: CARDIOLOGY CLINIC | Age: 68
End: 2023-12-14
Payer: MEDICARE

## 2023-12-14 VITALS
HEART RATE: 71 BPM | SYSTOLIC BLOOD PRESSURE: 142 MMHG | WEIGHT: 208 LBS | DIASTOLIC BLOOD PRESSURE: 88 MMHG | BODY MASS INDEX: 28.17 KG/M2 | HEIGHT: 72 IN | OXYGEN SATURATION: 97 %

## 2023-12-14 DIAGNOSIS — R06.02 SOB (SHORTNESS OF BREATH): ICD-10-CM

## 2023-12-14 DIAGNOSIS — I25.5 ISCHEMIC CARDIOMYOPATHY: ICD-10-CM

## 2023-12-14 DIAGNOSIS — R53.83 OTHER FATIGUE: ICD-10-CM

## 2023-12-14 DIAGNOSIS — Z79.899 MEDICATION MANAGEMENT: ICD-10-CM

## 2023-12-14 DIAGNOSIS — I10 ESSENTIAL HYPERTENSION: ICD-10-CM

## 2023-12-14 DIAGNOSIS — I25.10 CORONARY ARTERY DISEASE INVOLVING NATIVE CORONARY ARTERY OF NATIVE HEART WITHOUT ANGINA PECTORIS: Primary | ICD-10-CM

## 2023-12-14 PROCEDURE — G8484 FLU IMMUNIZE NO ADMIN: HCPCS | Performed by: INTERNAL MEDICINE

## 2023-12-14 PROCEDURE — G8427 DOCREV CUR MEDS BY ELIG CLIN: HCPCS | Performed by: INTERNAL MEDICINE

## 2023-12-14 PROCEDURE — 3079F DIAST BP 80-89 MM HG: CPT | Performed by: INTERNAL MEDICINE

## 2023-12-14 PROCEDURE — 1036F TOBACCO NON-USER: CPT | Performed by: INTERNAL MEDICINE

## 2023-12-14 PROCEDURE — 93000 ELECTROCARDIOGRAM COMPLETE: CPT | Performed by: INTERNAL MEDICINE

## 2023-12-14 PROCEDURE — 3017F COLORECTAL CA SCREEN DOC REV: CPT | Performed by: INTERNAL MEDICINE

## 2023-12-14 PROCEDURE — 1123F ACP DISCUSS/DSCN MKR DOCD: CPT | Performed by: INTERNAL MEDICINE

## 2023-12-14 PROCEDURE — 3077F SYST BP >= 140 MM HG: CPT | Performed by: INTERNAL MEDICINE

## 2023-12-14 PROCEDURE — 99214 OFFICE O/P EST MOD 30 MIN: CPT | Performed by: INTERNAL MEDICINE

## 2023-12-14 PROCEDURE — G8419 CALC BMI OUT NRM PARAM NOF/U: HCPCS | Performed by: INTERNAL MEDICINE

## 2023-12-14 RX ORDER — CARVEDILOL 12.5 MG/1
12.5 TABLET ORAL 2 TIMES DAILY WITH MEALS
Qty: 180 TABLET | Refills: 3 | Status: SHIPPED | OUTPATIENT
Start: 2023-12-14

## 2023-12-14 RX ORDER — HYDROCHLOROTHIAZIDE 25 MG/1
25 TABLET ORAL DAILY
Qty: 90 TABLET | Refills: 3 | Status: SHIPPED | OUTPATIENT
Start: 2023-12-14

## 2023-12-14 NOTE — PROGRESS NOTES
[address]   Cardiovascular Evaluation    PATIENT: Cuong Osuna  DATE: 2023  MRN: [id number]  CSN: [id number]  : 1955    Primary Care Doctor: Maynor Ag MD    Reason for evaluation:   Follow-up, Hypertension, Hyperlipidemia, Coronary Artery Disease, Fatigue, and Shortness of Breath    Subjective:    History of present illness on initial date of evaluation:   Cuong Osuna is a 77 y.o. male patient who presents for follow up. He has a medical history including coronary artery disease, hypertension, hyperlipidemia, obesity, diabetes mellitus, and renal carcinoma with nephrectomy. He underwent [facility] 2011 with single vessel CAD with PCI to mid RCA. [facility] 2014 two vessel critical CAD proximal LAD and mid distal Cx with YARA. He had a stress test completed on 19 that showed ejection fraction of 41%. He was hypertensive responsive to exercise and study showed soft tissue attenuation artifact with no ischemia. An Echocardiogram completed on 19 showed EF of 45%, which could not discrete area of basal inferior hypokinesis. Today he reports wife had battled with endometrial cancer. Patient currently denies any weight gain, edema, palpitations, chest pain, shortness of breath, dizziness, and syncope. He reports he is taking medications as prescribed, tolerating well. He reports SOB and fatigue.      Patient Active Problem List   Diagnosis    Diabetes mellitus ([address])    Essential hypertension    Mixed hyperlipidemia    GERD (gastroesophageal reflux disease)    Coronary artery disease involving native coronary artery of native heart without angina pectoris    Acute renal failure (HCC)    Prerenal azotemia    Nausea and vomiting    Sepsis due to Gram negative bacteria ([facility])    Fever and chills    Hypotension    Tachypnea    Poor appetite    NSTEMI (non-ST elevated myocardial infarction) ([facility])    Chest pain    SOB (shortness of breath)    Fatigue

## 2023-12-14 NOTE — PATIENT INSTRUCTIONS
Your provider has ordered testing for further evaluation. An order/prescription has been included in your paper work. To schedule outpatient testing, contact Central Scheduling by calling [email or web address] ([phone]). Plan:  Discontinue Lisinopril   2. Await 36 hours prior to Start Entresto 24-26 mg tablet twice daily  3. Discussed introducing Jardiance therapy will discuss at future office visit  4. Order echocardiogram ~ shortness of breath and fatigue  5. Order BMP in 14 days after starting Entresto  6.   Follow up with Nilton Estrada in 3 months

## 2024-02-13 ENCOUNTER — HOSPITAL ENCOUNTER (OUTPATIENT)
Dept: CARDIOLOGY | Age: 69
Discharge: HOME | End: 2024-02-13
Payer: MEDICARE

## 2024-02-13 DIAGNOSIS — I25.10 CORONARY ARTERY DISEASE INVOLVING NATIVE CORONARY ARTERY OF NATIVE HEART WITHOUT ANGINA PECTORIS: ICD-10-CM

## 2024-02-13 DIAGNOSIS — R53.83 OTHER FATIGUE: ICD-10-CM

## 2024-02-13 DIAGNOSIS — R06.02 SOB (SHORTNESS OF BREATH): ICD-10-CM

## 2024-02-13 PROCEDURE — 93306 TTE W/DOPPLER COMPLETE: CPT

## 2024-02-15 ENCOUNTER — TELEPHONE (OUTPATIENT)
Dept: CARDIOLOGY CLINIC | Age: 69
End: 2024-02-15

## 2024-02-15 NOTE — TELEPHONE ENCOUNTER
Called and spoke with patient. Relayed [facility] results he VU. He reports to notify [facility] he was started on Jardiance 10 mg daily by his PCP, has been on this therapy for 2 weeks tolerating well.

## 2024-02-15 NOTE — TELEPHONE ENCOUNTER
----- Message from Dane Hernandez MD sent at 2/14/2024  5:10 PM EST -----  The testing findings are consistent with the patient's previous test results and heart disease.

## 2024-03-15 NOTE — PROGRESS NOTES
ventriculography.  4.  Percutaneous coronary intervention of the left anterior descending artery  and circumflex coronary artery with drug-eluting stents.     ANGIOGRAPHIC FINDINGS:    1.  Two-vessel critical coronary artery disease with 95% stenosis of the  proximal LAD and 80% stenosis of the mid-distal circumflex.  There is a  patent stent noted within the right coronary artery and mild disease  throughout.  2.  Normal left ventricular function with EF of 60%.  3.  Normal hemodynamics.    CONCLUSIONS:  Two-vessel coronary artery angioplasty and drug-eluting stent in the setting of non-ST-segment-elevation myocardial infarction     7/7/2011 Cardiac cath/PCI:  ANGIOGRAPHY:    1.  The left main coronary artery is angiographically normal.   2.  The circumflex artery is a large vessel without significant disease.   There is mild distal 30%-40% narrowing of the circumflex artery.    3.  Left anterior descending artery is widely patent without disease.  The  diagonal branch also has mild 30%-40-% mid vessel stenosis.   4.  The right coronary artery is the dominant vessel with a focal mid vessel  99% stenosis of a MARIANGEL-3 flow to the distal vessel.  The lesion is hazy and  arises just prior to take off of an RV marginal branch.  The remainder of the  vessels are without significant disease.  There is no change to the lesion  after intracoronary nitroglycerin.  5.  Left ventriculogram shows uniform wall motion, normal left ventricular  systolic function.  LVF was estimated at 55%.    6.  After angioplasty and stenting, the RCA is widely patent with 0% residual  stenosis MARIANGEL-3 flow and no evidence for dissection.  The RV marginal branch  remains widely patent.       CONCLUSION:    1.  Severe single vessel coronary artery disease with successful angioplasty  and stenting of a mid-right coronary artery stenosis.   2.  Mild branch vessel and distal disease involving the left coronary artery.  3.  Well preserved LV systolic

## 2024-03-18 ENCOUNTER — OFFICE VISIT (OUTPATIENT)
Dept: CARDIOLOGY CLINIC | Age: 69
End: 2024-03-18
Payer: MEDICARE

## 2024-03-18 VITALS
DIASTOLIC BLOOD PRESSURE: 65 MMHG | OXYGEN SATURATION: 98 % | SYSTOLIC BLOOD PRESSURE: 120 MMHG | HEIGHT: 72 IN | WEIGHT: 206 LBS | HEART RATE: 72 BPM | BODY MASS INDEX: 27.9 KG/M2

## 2024-03-18 DIAGNOSIS — E78.2 MIXED HYPERLIPIDEMIA: ICD-10-CM

## 2024-03-18 DIAGNOSIS — I25.10 CORONARY ARTERY DISEASE INVOLVING NATIVE CORONARY ARTERY OF NATIVE HEART WITHOUT ANGINA PECTORIS: Primary | ICD-10-CM

## 2024-03-18 DIAGNOSIS — I25.5 ISCHEMIC CARDIOMYOPATHY: ICD-10-CM

## 2024-03-18 DIAGNOSIS — I10 ESSENTIAL HYPERTENSION: ICD-10-CM

## 2024-03-18 PROCEDURE — 3074F SYST BP LT 130 MM HG: CPT | Performed by: NURSE PRACTITIONER

## 2024-03-18 PROCEDURE — G8419 CALC BMI OUT NRM PARAM NOF/U: HCPCS | Performed by: NURSE PRACTITIONER

## 2024-03-18 PROCEDURE — 3078F DIAST BP <80 MM HG: CPT | Performed by: NURSE PRACTITIONER

## 2024-03-18 PROCEDURE — G8484 FLU IMMUNIZE NO ADMIN: HCPCS | Performed by: NURSE PRACTITIONER

## 2024-03-18 PROCEDURE — 3017F COLORECTAL CA SCREEN DOC REV: CPT | Performed by: NURSE PRACTITIONER

## 2024-03-18 PROCEDURE — 1036F TOBACCO NON-USER: CPT | Performed by: NURSE PRACTITIONER

## 2024-03-18 PROCEDURE — 1123F ACP DISCUSS/DSCN MKR DOCD: CPT | Performed by: NURSE PRACTITIONER

## 2024-03-18 PROCEDURE — 99214 OFFICE O/P EST MOD 30 MIN: CPT | Performed by: NURSE PRACTITIONER

## 2024-03-18 PROCEDURE — G8427 DOCREV CUR MEDS BY ELIG CLIN: HCPCS | Performed by: NURSE PRACTITIONER

## 2024-03-21 DIAGNOSIS — I25.5 ISCHEMIC CARDIOMYOPATHY: ICD-10-CM

## 2024-03-21 NOTE — TELEPHONE ENCOUNTER
Pt stated that he needs his 90 day refills of Entresto to go to [facility], [facility] is not covered by his insurance.     Last ov 03/18/2024 suzi

## 2024-05-06 ENCOUNTER — HOSPITAL ENCOUNTER (OUTPATIENT)
Dept: ULTRASOUND IMAGING | Age: 69
Discharge: HOME | End: 2024-05-06
Payer: MEDICARE

## 2024-05-06 DIAGNOSIS — I71.9 AORTIC ANEURYSM WITHOUT RUPTURE, UNSPECIFIED PORTION OF AORTA (HCC): ICD-10-CM

## 2024-05-06 DIAGNOSIS — Z13.6 SCREENING FOR AAA (AORTIC ABDOMINAL ANEURYSM): ICD-10-CM

## 2024-05-06 PROCEDURE — 76775 US EXAM ABDO BACK WALL LIM: CPT

## 2024-09-28 DIAGNOSIS — E78.2 MIXED HYPERLIPIDEMIA: ICD-10-CM

## 2024-09-30 RX ORDER — ATORVASTATIN CALCIUM 80 MG/1
TABLET, FILM COATED ORAL
Qty: 90 TABLET | Refills: 1 | Status: SHIPPED | OUTPATIENT
Start: 2024-09-30

## 2024-09-30 NOTE — TELEPHONE ENCOUNTER
Last ov: 03/18/24  Upcoming ov: None     LIPID 01/30/24 CareEverywhere   LFT 01/12/23    Spoke with pt. Pt stated he was going to the dr tomorrow and will be getting lab work done.

## 2024-11-23 DIAGNOSIS — I25.10 CORONARY ARTERY DISEASE INVOLVING NATIVE CORONARY ARTERY OF NATIVE HEART WITHOUT ANGINA PECTORIS: ICD-10-CM

## 2024-11-25 RX ORDER — CARVEDILOL 12.5 MG/1
12.5 TABLET ORAL 2 TIMES DAILY WITH MEALS
Qty: 60 TABLET | Refills: 1 | Status: SHIPPED | OUTPATIENT
Start: 2024-11-25

## 2024-11-25 RX ORDER — HYDROCHLOROTHIAZIDE 25 MG/1
25 TABLET ORAL DAILY
Qty: 30 TABLET | Refills: 1 | Status: SHIPPED | OUTPATIENT
Start: 2024-11-25

## 2024-11-25 NOTE — TELEPHONE ENCOUNTER
11/25- called pt @[phone] pt is now scheduled next avail 1/29/2025 w JANNA. Please review med request.

## 2024-11-25 NOTE — TELEPHONE ENCOUNTER
Front- can you call and schedule pt please? 30 days pended    Last Office Visit: 3/18/2024 Provider: NPDE  Is provider OOT? No      **If no OV, when does pt need to be seen? in 1 month(s)  **Has patient already had 30 day supply? No    LAST LABS:   BMP:   Lab Results   Component Value Date/Time     07/12/2023 12:00 AM    K 5.2 07/12/2023 12:00 AM    K 4.9 03/23/2022 07:03 AM     07/12/2023 12:00 AM    CO2 26 07/12/2023 12:00 AM    BUN 25 07/12/2023 12:00 AM    CREATININE 1.28 07/12/2023 12:00 AM    GLUCOSE 104 07/12/2023 12:00 AM    CALCIUM 9.7 07/12/2023 12:00 AM    LABGLOM 61 07/12/2023 12:00 AM      **Check Care Everywhere? yes - lipid paned, LFT, and A1C in care everywhere      Is encounter provider correct?   Yes  Does refill dosage match last filled?   Yes  Changes to script from Tele Encounters or LOV Plan?   no  Did you adjust dispensed amount or refills to reflect last and upcoming OV?  Yes    Requested Prescriptions     Pending Prescriptions Disp Refills    carvedilol (COREG) 12.5 MG tablet [Pharmacy Med Name: CARVEDILOL TAB 12.5MG] 180 tablet 3     Sig: TAKE 1 TABLET TWICE DAILY  WITH MEALS    hydroCHLOROthiazide (HYDRODIURIL) 25 MG tablet [Pharmacy Med Name: HYDROCHLOROT TAB 25MG] 90 tablet 3     Sig: TAKE 1 TABLET DAILY

## 2024-12-24 DIAGNOSIS — I25.10 CORONARY ARTERY DISEASE INVOLVING NATIVE CORONARY ARTERY OF NATIVE HEART WITHOUT ANGINA PECTORIS: ICD-10-CM

## 2024-12-26 NOTE — TELEPHONE ENCOUNTER
Last Office Visit: 3/18/2024 Provider: AILYN  **Is provider OOT? Yes    Next Office Visit: 1/27/2025 Provider: [facility]    Lab orders needed? yes - labs ordered , ordered until next office visit.   Encounter provider correct? Yes If not, change provider  Script changes since last refill? no    LAST LABS:   BMP:   Lab Results   Component Value Date/Time     07/12/2023 12:00 AM    K 5.2 07/12/2023 12:00 AM    K 4.9 03/23/2022 07:03 AM     07/12/2023 12:00 AM    CO2 26 07/12/2023 12:00 AM    BUN 25 07/12/2023 12:00 AM    CREATININE 1.28 07/12/2023 12:00 AM    GLUCOSE 104 07/12/2023 12:00 AM    CALCIUM 9.7 07/12/2023 12:00 AM    LABGLOM 61 07/12/2023 12:00 AM

## 2024-12-27 RX ORDER — CARVEDILOL 12.5 MG/1
12.5 TABLET ORAL 2 TIMES DAILY WITH MEALS
Qty: 66 TABLET | Refills: 0 | Status: SHIPPED | OUTPATIENT
Start: 2024-12-27

## 2024-12-27 RX ORDER — HYDROCHLOROTHIAZIDE 25 MG/1
25 TABLET ORAL DAILY
Qty: 33 TABLET | Refills: 0 | Status: SHIPPED | OUTPATIENT
Start: 2024-12-27

## 2024-12-27 NOTE — TELEPHONE ENCOUNTER
Pt's PCP office sts they need our office to request lab results done in their office before they can fax to us. Please fax request for lab results to [phone]. Labs were done in 6/2024.

## 2025-01-24 NOTE — PROGRESS NOTES
AM    CREATININE 1.28 07/12/2023 12:00 AM    GFRAA >60 03/23/2022 07:03 AM    GFRAA >60 04/03/2013 06:10 AM    AGRATIO 1.5 03/23/2022 07:03 AM    LABGLOM 61 07/12/2023 12:00 AM    GLUCOSE 104 07/12/2023 12:00 AM    CALCIUM 9.7 07/12/2023 12:00 AM    BILITOT 0.5 01/12/2023 12:00 AM    ALKPHOS 78 01/12/2023 12:00 AM    AST 16 01/12/2023 12:00 AM    ALT 8 01/12/2023 12:00 AM     PT/INR:    No results found for: \"PTINR\"  Lab Results   Component Value Date    CKTOTAL 390 (H) 04/21/2014    TROPONINI <0.01 03/23/2022     No components found for: \"CHLPL\"  Lab Results   Component Value Date    TRIG 162 01/12/2023    TRIG 325 (H) 08/22/2014    TRIG 269 (H) 04/01/2013     Lab Results   Component Value Date    HDL 35 01/12/2023    HDL 24 (L) 08/22/2014    HDL 28 (L) 04/01/2013     No components found for: \"LDLCALC\"    No components found for: \"LABVLDL\"    Lab Results   Component Value Date    ALT 8 01/12/2023    AST 16 01/12/2023    ALKPHOS 78 01/12/2023    BILITOT 0.5 01/12/2023       Assessment:  69 y.o. patient with:   Diagnosis Orders   1. Coronary artery disease involving native coronary artery of native heart without angina pectoris  EKG 12 lead    carvedilol (COREG) 12.5 MG tablet    hydroCHLOROthiazide (HYDRODIURIL) 25 MG tablet    sacubitril-valsartan (ENTRESTO) 24-26 MG per tablet    Echo (TTE) complete (PRN contrast/bubble/strain/3D)      2. Mixed hyperlipidemia  atorvastatin (LIPITOR) 80 MG tablet      3. Ischemic cardiomyopathy  sacubitril-valsartan (ENTRESTO) 24-26 MG per tablet    Echo (TTE) complete (PRN contrast/bubble/strain/3D)      4. History of coronary artery stent placement  Echo (TTE) complete (PRN contrast/bubble/strain/3D)          Plan:  EKG reviewed  Great job on not smoking. Continue to avoid as this is risk factor for heart attack, stroke, and/or cancer.    Reviewed AAA US screening within normal limits  I recommend that the patient continue their currently prescribed medications. Their drug

## 2025-01-27 ENCOUNTER — OFFICE VISIT (OUTPATIENT)
Dept: CARDIOLOGY CLINIC | Age: 70
End: 2025-01-27
Payer: MEDICARE

## 2025-01-27 VITALS
SYSTOLIC BLOOD PRESSURE: 102 MMHG | WEIGHT: 203 LBS | BODY MASS INDEX: 27.5 KG/M2 | HEIGHT: 72 IN | DIASTOLIC BLOOD PRESSURE: 60 MMHG | HEART RATE: 70 BPM | OXYGEN SATURATION: 98 %

## 2025-01-27 DIAGNOSIS — Z95.5 HISTORY OF CORONARY ARTERY STENT PLACEMENT: ICD-10-CM

## 2025-01-27 DIAGNOSIS — E78.2 MIXED HYPERLIPIDEMIA: ICD-10-CM

## 2025-01-27 DIAGNOSIS — I25.5 ISCHEMIC CARDIOMYOPATHY: ICD-10-CM

## 2025-01-27 DIAGNOSIS — I25.10 CORONARY ARTERY DISEASE INVOLVING NATIVE CORONARY ARTERY OF NATIVE HEART WITHOUT ANGINA PECTORIS: Primary | ICD-10-CM

## 2025-01-27 PROCEDURE — 1036F TOBACCO NON-USER: CPT | Performed by: INTERNAL MEDICINE

## 2025-01-27 PROCEDURE — 3078F DIAST BP <80 MM HG: CPT | Performed by: INTERNAL MEDICINE

## 2025-01-27 PROCEDURE — 93000 ELECTROCARDIOGRAM COMPLETE: CPT | Performed by: INTERNAL MEDICINE

## 2025-01-27 PROCEDURE — G8419 CALC BMI OUT NRM PARAM NOF/U: HCPCS | Performed by: INTERNAL MEDICINE

## 2025-01-27 PROCEDURE — 99214 OFFICE O/P EST MOD 30 MIN: CPT | Performed by: INTERNAL MEDICINE

## 2025-01-27 PROCEDURE — G8427 DOCREV CUR MEDS BY ELIG CLIN: HCPCS | Performed by: INTERNAL MEDICINE

## 2025-01-27 PROCEDURE — 1123F ACP DISCUSS/DSCN MKR DOCD: CPT | Performed by: INTERNAL MEDICINE

## 2025-01-27 PROCEDURE — 1159F MED LIST DOCD IN RCRD: CPT | Performed by: INTERNAL MEDICINE

## 2025-01-27 PROCEDURE — 3074F SYST BP LT 130 MM HG: CPT | Performed by: INTERNAL MEDICINE

## 2025-01-27 PROCEDURE — 3017F COLORECTAL CA SCREEN DOC REV: CPT | Performed by: INTERNAL MEDICINE

## 2025-01-27 RX ORDER — CARVEDILOL 12.5 MG/1
12.5 TABLET ORAL 2 TIMES DAILY WITH MEALS
Qty: 180 TABLET | Refills: 3 | Status: SHIPPED | OUTPATIENT
Start: 2025-01-27

## 2025-01-27 RX ORDER — ATORVASTATIN CALCIUM 80 MG/1
80 TABLET, FILM COATED ORAL DAILY
Qty: 90 TABLET | Refills: 3 | Status: SHIPPED | OUTPATIENT
Start: 2025-01-27

## 2025-01-27 RX ORDER — HYDROCHLOROTHIAZIDE 25 MG/1
25 TABLET ORAL DAILY
Qty: 90 TABLET | Refills: 3 | Status: SHIPPED | OUTPATIENT
Start: 2025-01-27

## 2025-01-27 NOTE — PATIENT INSTRUCTIONS
Your provider has ordered testing for further evaluation.  An order/prescription has been included in your paper work.   To schedule outpatient testing, contact Central Scheduling by calling [email or web address] ([phone]).         Plan:  EKG reviewed  Great job on not smoking. Continue to avoid as this is risk factor for heart attack, stroke, and/or cancer.    Reviewed AAA US screening within normal limits  I recommend that the patient continue their currently prescribed medications. Their drug modifiable risk factors appear to be well controlled. I will continue to address the need/dosing of medications in future visits.   Order echocardiogram ~ ischemic cardiomyopathy   ~    Discussed increasing Entresto therapy based upon results   6.   Follow up with me in 1 year

## 2025-03-07 ENCOUNTER — HOSPITAL ENCOUNTER (OUTPATIENT)
Dept: CARDIOLOGY | Age: 70
Discharge: HOME | End: 2025-03-09
Attending: INTERNAL MEDICINE
Payer: MEDICARE

## 2025-03-07 VITALS
SYSTOLIC BLOOD PRESSURE: 120 MMHG | HEIGHT: 72 IN | BODY MASS INDEX: 27.9 KG/M2 | DIASTOLIC BLOOD PRESSURE: 65 MMHG | WEIGHT: 206 LBS

## 2025-03-07 DIAGNOSIS — I25.10 CORONARY ARTERY DISEASE INVOLVING NATIVE CORONARY ARTERY OF NATIVE HEART WITHOUT ANGINA PECTORIS: ICD-10-CM

## 2025-03-07 DIAGNOSIS — I25.5 ISCHEMIC CARDIOMYOPATHY: ICD-10-CM

## 2025-03-07 DIAGNOSIS — Z95.5 HISTORY OF CORONARY ARTERY STENT PLACEMENT: ICD-10-CM

## 2025-03-07 LAB
ECHO AO ASC DIAM: 3.9 CM
ECHO AO ASCENDING AORTA INDEX: 1.81 CM/M2
ECHO AO ROOT DIAM: 3.5 CM
ECHO AO ROOT INDEX: 1.62 CM/M2
ECHO AV CUSP MM: 2 CM
ECHO AV MEAN GRADIENT: 5 MMHG
ECHO AV MEAN VELOCITY: 1 M/S
ECHO AV PEAK GRADIENT: 10 MMHG
ECHO AV PEAK GRADIENT: 10 MMHG
ECHO AV PEAK VELOCITY: 1.6 M/S
ECHO AV VELOCITY RATIO: 0.69
ECHO AV VTI: 33.6 CM
ECHO BSA: 2.18 M2
ECHO EST RA PRESSURE: 3 MMHG
ECHO LA AREA 2C: 18 CM2
ECHO LA AREA 4C: 16.2 CM2
ECHO LA DIAMETER INDEX: 1.62 CM/M2
ECHO LA DIAMETER: 3.5 CM
ECHO LA MAJOR AXIS: 5.2 CM
ECHO LA MINOR AXIS: 5.2 CM
ECHO LA TO AORTIC ROOT RATIO: 1
ECHO LA VOL BP: 46 ML (ref 18–58)
ECHO LA VOL MOD A2C: 51 ML (ref 18–58)
ECHO LA VOL MOD A4C: 41 ML (ref 18–58)
ECHO LA VOL/BSA BIPLANE: 21 ML/M2 (ref 16–34)
ECHO LA VOLUME INDEX MOD A2C: 24 ML/M2 (ref 16–34)
ECHO LA VOLUME INDEX MOD A4C: 19 ML/M2 (ref 16–34)
ECHO LV E' LATERAL VELOCITY: 12.1 CM/S
ECHO LV E' SEPTAL VELOCITY: 8.05 CM/S
ECHO LV EDV 3D: 180 ML
ECHO LV EDV INDEX 3D: 83 ML/M2
ECHO LV EJECTION FRACTION 3D: 56 %
ECHO LV ESV 3D: 80 ML
ECHO LV ESV INDEX 3D: 37 ML/M2
ECHO LV FRACTIONAL SHORTENING: 36 % (ref 28–44)
ECHO LV GLOBAL LONGITUDINAL STRAIN (GLS): -16.4 %
ECHO LV GLOBAL LONGITUDINAL STRAIN (GLS): -18.1 %
ECHO LV GLOBAL LONGITUDINAL STRAIN (GLS): -18.3 %
ECHO LV GLOBAL LONGITUDINAL STRAIN (GLS): -19.8 %
ECHO LV INTERNAL DIMENSION DIASTOLE INDEX: 2.08 CM/M2
ECHO LV INTERNAL DIMENSION DIASTOLIC: 4.5 CM (ref 4.2–5.9)
ECHO LV INTERNAL DIMENSION SYSTOLIC INDEX: 1.34 CM/M2
ECHO LV INTERNAL DIMENSION SYSTOLIC: 2.9 CM
ECHO LV ISOVOLUMETRIC RELAXATION TIME (IVRT): 106 MS
ECHO LV IVSD: 1 CM (ref 0.6–1)
ECHO LV MASS 2D: 142.9 G (ref 88–224)
ECHO LV MASS 3D INDEX: 63.4 G/M2
ECHO LV MASS 3D: 137 G
ECHO LV MASS INDEX 2D: 66.2 G/M2 (ref 49–115)
ECHO LV POSTERIOR WALL DIASTOLIC: 0.9 CM (ref 0.6–1)
ECHO LV RELATIVE WALL THICKNESS RATIO: 0.4
ECHO LVOT AV VTI INDEX: 0.66
ECHO LVOT MEAN GRADIENT: 2 MMHG
ECHO LVOT PEAK GRADIENT: 5 MMHG
ECHO LVOT PEAK VELOCITY: 1.1 M/S
ECHO LVOT VTI: 22.3 CM
ECHO MV A VELOCITY: 0.95 M/S
ECHO MV E DECELERATION TIME (DT): 277 MS
ECHO MV E VELOCITY: 0.65 M/S
ECHO MV E/A RATIO: 0.68
ECHO MV E/E' LATERAL: 5.37
ECHO MV E/E' RATIO (AVERAGED): 6.72
ECHO MV E/E' SEPTAL: 8.07
ECHO RA AREA 4C: 15.4 CM2
ECHO RA END SYSTOLIC VOLUME APICAL 4 CHAMBER INDEX BSA: 16 ML/M2
ECHO RA VOLUME: 35 ML
ECHO RIGHT VENTRICULAR SYSTOLIC PRESSURE (RVSP): 25 MMHG
ECHO RV FREE WALL PEAK S': 12.7 CM/S
ECHO RV TAPSE: 2.3 CM (ref 1.7–?)
ECHO TV REGURGITANT MAX VELOCITY: 2.32 M/S
ECHO TV REGURGITANT PEAK GRADIENT: 22 MMHG

## 2025-03-07 PROCEDURE — 93306 TTE W/DOPPLER COMPLETE: CPT | Performed by: INTERNAL MEDICINE

## 2025-03-07 PROCEDURE — 93306 TTE W/DOPPLER COMPLETE: CPT

## 2025-03-07 PROCEDURE — 93356 MYOCRD STRAIN IMG SPCKL TRCK: CPT | Performed by: INTERNAL MEDICINE

## 2025-03-10 ENCOUNTER — RESULTS FOLLOW-UP (OUTPATIENT)
Dept: CARDIOLOGY | Age: 70
End: 2025-03-10